# Patient Record
Sex: MALE | Race: WHITE | Employment: FULL TIME | URBAN - METROPOLITAN AREA
[De-identification: names, ages, dates, MRNs, and addresses within clinical notes are randomized per-mention and may not be internally consistent; named-entity substitution may affect disease eponyms.]

---

## 2017-12-22 ENCOUNTER — OFFICE VISIT (OUTPATIENT)
Dept: FAMILY MEDICINE | Facility: CLINIC | Age: 32
End: 2017-12-22
Payer: COMMERCIAL

## 2017-12-22 VITALS
WEIGHT: 148 LBS | HEART RATE: 114 BPM | OXYGEN SATURATION: 95 % | BODY MASS INDEX: 19.61 KG/M2 | TEMPERATURE: 98.6 F | RESPIRATION RATE: 16 BRPM | DIASTOLIC BLOOD PRESSURE: 88 MMHG | HEIGHT: 73 IN | SYSTOLIC BLOOD PRESSURE: 108 MMHG

## 2017-12-22 DIAGNOSIS — A60.01 HERPES SIMPLEX INFECTION OF PENIS: ICD-10-CM

## 2017-12-22 DIAGNOSIS — Z11.3 SCREEN FOR STD (SEXUALLY TRANSMITTED DISEASE): Primary | ICD-10-CM

## 2017-12-22 PROCEDURE — 87591 N.GONORRHOEAE DNA AMP PROB: CPT | Performed by: PHYSICIAN ASSISTANT

## 2017-12-22 PROCEDURE — 86780 TREPONEMA PALLIDUM: CPT | Performed by: PHYSICIAN ASSISTANT

## 2017-12-22 PROCEDURE — 36415 COLL VENOUS BLD VENIPUNCTURE: CPT | Performed by: PHYSICIAN ASSISTANT

## 2017-12-22 PROCEDURE — 87491 CHLMYD TRACH DNA AMP PROBE: CPT | Performed by: PHYSICIAN ASSISTANT

## 2017-12-22 PROCEDURE — 87389 HIV-1 AG W/HIV-1&-2 AB AG IA: CPT | Performed by: PHYSICIAN ASSISTANT

## 2017-12-22 PROCEDURE — 99213 OFFICE O/P EST LOW 20 MIN: CPT | Performed by: PHYSICIAN ASSISTANT

## 2017-12-22 PROCEDURE — 86803 HEPATITIS C AB TEST: CPT | Performed by: PHYSICIAN ASSISTANT

## 2017-12-22 NOTE — MR AVS SNAPSHOT
"              After Visit Summary   12/22/2017    Braeden Escobar    MRN: [id number]           Patient Information     Date Of Birth          1985        Visit Information        Provider Department      12/22/2017 11:50 AM Siegler, Nicole Joy, PA-C [facility]        Today's Diagnoses     Screen for STD (sexually transmitted disease)    -  1    Herpes simplex infection of penis           Follow-ups after your visit        Who to contact     If you have questions or need follow up information about today's clinic visit or your schedule please contact [facility] directly at [phone].  Normal or non-critical lab and imaging results will be communicated to you by Zamzeehart, letter or phone within 4 business days after the clinic has received the results. If you do not hear from us within 7 days, please contact the clinic through Zamzeehart or phone. If you have a critical or abnormal lab result, we will notify you by phone as soon as possible.  Submit refill requests through [employer] or call your pharmacy and they will forward the refill request to us. Please allow 3 business days for your refill to be completed.          Additional Information About Your Visit        MyChart Information     [employer] gives you secure access to your electronic health record. If you see a primary care provider, you can also send messages to your care team and make appointments. If you have questions, please call your primary care clinic.  If you do not have a primary care provider, please call [phone] and they will assist you.        Care EveryWhere ID     This is your Care EveryWhere ID. This could be used by other organizations to access your [facility] medical records  [id number]        Your Vitals Were     Pulse Temperature Respirations Height Pulse Oximetry BMI (Body Mass Index)    114 98.6  F (37  C) (Tympanic) 16 6' 1\" (1.854 m) 95% 19.53 kg/m2       Blood Pressure " from Last 3 Encounters:   12/22/17 108/88   03/30/16 103/62   08/20/15 113/76    Weight from Last 3 Encounters:   12/22/17 148 lb (67.1 kg)   03/30/16 147 lb 6.4 oz (66.9 kg)   08/20/15 142 lb (64.4 kg)              We Performed the Following     Anti Treponema     Chlamydia trachomatis PCR     Hepatitis C Screen Reflex to HCV RNA Quant and Genotype     HIV Antigen Antibody Combo     Neisseria gonorrhoeae PCR        Primary Care Provider Office Phone # Fax #    Domenica Hutchison PA-C [phone] [address]  [place] [address]        Equal Access to Services     RICHARD FERNANDES : Hadii quentin kennedy hadlornao Solore, waaxda luqadaha, qaybta kaalmada adeegyada, emy herzog . So [facility] [phone].    ATENCIÓN: Si habla español, tiene a wynne disposición servicios gratuitos de asistencia lingüística. Llame al [phone].    We comply with applicable federal civil rights laws and [place] laws. We do not discriminate on the basis of race, color, national origin, age, disability, sex, sexual orientation, or gender identity.            Thank you!     Thank you for choosing [facility]  for your care. Our goal is always to provide you with excellent care. Hearing back from our patients is one way we can continue to improve our services. Please take a few minutes to complete the written survey that you may receive in the mail after your visit with us. Thank you!             Your Updated Medication List - Protect others around you: Learn how to safely use, store and throw away your medicines at www.disposemymeds.org.          This list is accurate as of: 12/22/17 12:08 PM.  Always use your most recent med list.                   Brand Name Dispense Instructions for use Diagnosis    busPIRone 5 MG tablet    BUSPAR     Take 5 mg by mouth 3 times daily as needed        finasteride 5 MG tablet    PROSCAR     Take 5 mg by mouth daily        LORazepam 0.5  MG tablet    ATIVAN     Take 0.5-2 tablets by mouth 3 times daily as needed For anxiety        sertraline 50 MG tablet    ZOLOFT    90 tablet    TAKE 1 TABLET(50 MG) BY MOUTH DAILY    Anxiety       traZODone 50 MG tablet    DESYREL     Take 50 mg by mouth daily

## 2017-12-22 NOTE — NURSING NOTE
"Chief Complaint   Patient presents with     STD     Requesting STD Testing.       Initial /88 (BP Location: Left arm, Patient Position: Sitting, Cuff Size: Adult Regular)  Pulse 114  Temp 98.6  F (37  C) (Tympanic)  Resp 16  Ht 6' 1\" (1.854 m)  Wt 148 lb (67.1 kg)  SpO2 95%  BMI 19.53 kg/m2 Estimated body mass index is 19.53 kg/(m^2) as calculated from the following:    Height as of this encounter: 6' 1\" (1.854 m).    Weight as of this encounter: 148 lb (67.1 kg).  Medication Reconciliation: complete     Miriam Balbuena LPN  "

## 2017-12-22 NOTE — PROGRESS NOTES
SUBJECTIVE:   Braeden Escobar is a 32 year old male who presents to clinic today for the following health issues:    STD testing      Duration: 3 months ago    Description (location/character/radiation): None    Intensity:  NA    Accompanying signs and symptoms: Denies symptoms    History (similar episodes/previous evaluation): None    Precipitating or alleviating factors: None    Therapies tried and outcome: None     Patients most recent unprotected sexual encounter was 3 months ago. He has known genital herpes. He has not been exposed to new STD that he is aware of. He is sexually active with female partners. He denies symptoms of penile discharge, painful urination or frequent urination, skin rash or pain, testicular swelling or pain. He does have a small lesion on the penile shaft that has been there for a few days, prompting him to be seen today.     Problem list and histories reviewed & adjusted, as indicated.  Additional history: as documented    Patient Active Problem List   Diagnosis     CARDIOVASCULAR SCREENING; LDL GOAL LESS THAN 160     Lyme disease     Genital herpes     Chickenpox     Anxiety     Low back pain     Past Surgical History:   Procedure Laterality Date     C EXPLORE UNDESC TESTIS,ABDOMINAL         Social History   Substance Use Topics     Smoking status: Never Smoker     Smokeless tobacco: Never Used     Alcohol use 1.2 - 1.8 oz/week     2 - 3 Standard drinks or equivalent per week      Comment: occasional use     Family History   Problem Relation Age of Onset     Anxiety Disorder Mother      Cancer - colorectal Maternal Grandfather 50     HEART DISEASE Paternal Grandfather          Current Outpatient Prescriptions   Medication Sig Dispense Refill     traZODone (DESYREL) 50 MG tablet Take 50 mg by mouth daily  2     finasteride (PROSCAR) 5 MG tablet Take 5 mg by mouth daily  3     busPIRone (BUSPAR) 5 MG tablet Take 5 mg by mouth 3 times daily as needed  0     LORazepam (ATIVAN) 0.5 MG  "tablet Take 0.5-2 tablets by mouth 3 times daily as needed For anxiety  0     sertraline (ZOLOFT) 50 MG tablet TAKE 1 TABLET(50 MG) BY MOUTH DAILY (Patient not taking: Reported on 12/22/2017) 90 tablet 0         Reviewed and updated as needed this visit by clinical staffTobacco  Allergies  Meds  Problems  Med Hx  Surg Hx  Fam Hx  Soc Hx        Reviewed and updated as needed this visit by Provider         ROS:  Constitutional, HEENT, cardiovascular, pulmonary, gi and gu systems are negative, except as otherwise noted.      OBJECTIVE:   /88 (BP Location: Left arm, Patient Position: Sitting, Cuff Size: Adult Regular)  Pulse 114  Temp 98.6  F (37  C) (Tympanic)  Resp 16  Ht 6' 1\" (1.854 m)  Wt 148 lb (67.1 kg)  SpO2 95%  BMI 19.53 kg/m2  Body mass index is 19.53 kg/(m^2).  GENERAL: healthy, alert and no distress   (male): testicles normal without atrophy or masses, no hernias, penis normal without urethral discharge and 2 lesions 2mm diameter, flesh toned and non vesicular, without tenderness to palpation or surrounding redness.     Diagnostic Test Results:  Pending below    ASSESSMENT/PLAN:       ICD-10-CM    1. Screen for STD (sexually transmitted disease) Z11.3 Chlamydia trachomatis PCR     HIV Antigen Antibody Combo     Hepatitis C Screen Reflex to HCV RNA Quant and Genotype     Anti Treponema     Neisseria gonorrhoeae PCR   2. Herpes simplex infection of penis A60.01        Discussed labs being evaluated above. He has known herpes infection so we will not test him for that. His lesions on the penile shaft do not appear as herpes lesions and are not painful or itchy to him. They appear as likely 1) irritation vs 2) start of a genital wart. We discussed these possibilities and he will continue to use condoms and monitor these lesions, return if they do not go away on their own. He is recommended to wear loose fitting clothing and undergarments to reduce the potential for irritation of the skin " and lesion/rash formation.     15 total minutes spent with the patient, > 50% face to face discussing the patients concerns and addressing questions in the above assessment and plan.         Nicole Joy Siegler, PA-C  [facility]

## 2017-12-23 LAB
HCV AB SERPL QL IA: NONREACTIVE
HIV 1+2 AB+HIV1 P24 AG SERPL QL IA: NONREACTIVE
T PALLIDUM IGG+IGM SER QL: NEGATIVE

## 2017-12-24 LAB
C TRACH DNA SPEC QL NAA+PROBE: NEGATIVE
N GONORRHOEA DNA SPEC QL NAA+PROBE: NEGATIVE
SPECIMEN SOURCE: NORMAL
SPECIMEN SOURCE: NORMAL

## 2018-01-12 ENCOUNTER — MYC MEDICAL ADVICE (OUTPATIENT)
Dept: FAMILY MEDICINE | Facility: CLINIC | Age: 33
End: 2018-01-12

## 2018-01-17 ENCOUNTER — OFFICE VISIT (OUTPATIENT)
Dept: FAMILY MEDICINE | Facility: CLINIC | Age: 33
End: 2018-01-17
Payer: COMMERCIAL

## 2018-01-17 VITALS
SYSTOLIC BLOOD PRESSURE: 110 MMHG | TEMPERATURE: 97.7 F | RESPIRATION RATE: 16 BRPM | DIASTOLIC BLOOD PRESSURE: 72 MMHG | OXYGEN SATURATION: 98 % | BODY MASS INDEX: 19.35 KG/M2 | HEIGHT: 73 IN | HEART RATE: 78 BPM | WEIGHT: 146 LBS

## 2018-01-17 DIAGNOSIS — A63.0 GENITAL WARTS: Primary | ICD-10-CM

## 2018-01-17 PROCEDURE — 99213 OFFICE O/P EST LOW 20 MIN: CPT | Performed by: FAMILY MEDICINE

## 2018-01-17 RX ORDER — IMIQUIMOD 12.5 MG/.25G
CREAM TOPICAL
Qty: 12 PACKET | Refills: 3 | Status: SHIPPED | OUTPATIENT
Start: 2018-01-17 | End: 2020-03-25

## 2018-01-17 NOTE — MR AVS SNAPSHOT
After Visit Summary   1/17/2018    Braeden Escobar    MRN: [id number]           Patient Information     Date Of Birth          1985        Visit Information        Provider Department      1/17/2018 8:10 AM Rosalinda Galvez DO [facility]        Today's Diagnoses     Genital warts    -  1      Care Instructions      Genital Warts (Condyloma)     Ask your healthcare provider about the HPV vaccine.     Genital warts (also called condyloma) are caused by a virus that is often transmitted sexually. This virus is known as human papillomavirus  (HPV). The warts are often so tiny that they are hard to see. But even tiny warts can cause big trouble, especially in women. Genital warts can cause cell changes that can lead to genital cancers such as cervical cancer. Warts can even be passed to babies during childbirth.  Note: Latex condoms may help protect against genital warts. But condoms don t cover all the areas that can get infected. That means condoms may not protect you completely.   What are the symptoms of genital warts?  Genital warts can be flat. Or they can be raised and look like tiny cauliflowers. They can grow on the penis, vagina, or cervix. They can also grow in and around the rectum, and even in the throat. You can have the virus for many months before the warts appear. Or you may have the virus but never develop visible warts. Once warts form, they are often too small to be noticed. That s why you need regular exams by your healthcare provider. He or she can find tiny warts and can check your cells for changes that mean the virus is present.  What is the treatment of genital warts?  Genital warts tend to grow with time. The smaller the warts are, the easier they are to remove. So don t delay. Warts are most often removed with chemicals. Sometimes they re frozen off with liquid nitrogen. Warts may also be removed with heat or laser. More than one treatment  may be needed. Never try to treat genital warts yourself.  How are genital warts prevented?  To prevent genital warts, consider getting vaccinated. Your healthcare provider can tell you if the vaccine is right for you. Also know your partner s sexual history. Even if someone doesn t have visible warts, he or she can still transmit the virus. Protect yourself by using latex condoms. And get regular medical exams. In women, regular Pap smears can help detect changes caused by genital warts and catch any signs of cervical cancer early. Also, ask your healthcare provider about the HPV vaccine.  Resources  American Social Health Association STD Hotline   399.225.8285   www.ashastd.org  Centers for Disease Control and Prevention   786.734.3829  www.cdc.gov/std   Date Last Reviewed: 1/1/2017 2000-2017 [employer]. [address] [place] [address]. All rights reserved. This information is not intended as a substitute for professional medical care. Always follow your healthcare professional's instructions.        Treating Genital Warts  Warts sometimes go away on their own, remain unchanged, or increase in size and number. Depending on where the warts are, some treatments may work better than others. However, even though these treatments may remove the wart, the virus that caused the wart usually remains in the skin.  Medicines     Medicines can be applied to break warts apart.   Prescription creams and gels can be applied to warts and surrounding skin. Some prompt your immune system to rally against HPV (human papillomavirus), the virus that causes genital warts. Others are caustic agents that destroy warts. Medicines can be applied at the health care provider's office or at home. Often, more than one dose is needed. These treatments sometimes cause skin rashes. Talk to your healthcare provider about possible side effects.     Wart removal     Warts may be removed using a heated wire loop.   Warts  can be removed in a number of ways. These include freezing, heat (cautery), lasers, and surgery. These procedures are done by your regular healthcare provider or a specialist. Before treatment, you may receive local anesthesia to numb the area. The number of treatments depends on how many warts are being removed. Your healthcare provider can give you more details.     Other treatment options for genital warts  As more is learned about HPV, new treatments are being developed to help the body defend itself. Your healthcare provider can tell you more about treatments that may someday be available. There is also a vaccine that can prevent HPV in young men and women before you even have the virus. Your healthcare provider can tell you more.   Date Last Reviewed: 1/1/2017 2000-2017 [employer]. [address] [place] [address], [place] [address]. All rights reserved. This information is not intended as a substitute for professional medical care. Always follow your healthcare professional's instructions.                Follow-ups after your visit        Who to contact     If you have questions or need follow up information about today's clinic visit or your schedule please contact [facility] directly at [phone].  Normal or non-critical lab and imaging results will be communicated to you by MyChart, letter or phone within 4 business days after the clinic has received the results. If you do not hear from us within 7 days, please contact the clinic through Hitmeister[employer] or phone. If you have a critical or abnormal lab result, we will notify you by phone as soon as possible.  Submit refill requests through [employer] or call your pharmacy and they will forward the refill request to us. Please allow 3 business days for your refill to be completed.          Additional Information About Your Visit        MyChart Information     [employer] gives you secure access to your electronic health record. If  "you see a primary care provider, you can also send messages to your care team and make appointments. If you have questions, please call your primary care clinic.  If you do not have a primary care provider, please call [phone] and they will assist you.        Care EveryWhere ID     This is your Care EveryWhere ID. This could be used by other organizations to access your [facility] medical records  [id number]        Your Vitals Were     Pulse Temperature Respirations Height Pulse Oximetry BMI (Body Mass Index)    78 97.7  F (36.5  C) (Tympanic) 16 6' 1\" (1.854 m) 98% 19.26 kg/m2       Blood Pressure from Last 3 Encounters:   01/17/18 110/72   12/22/17 108/88   03/30/16 103/62    Weight from Last 3 Encounters:   01/17/18 146 lb (66.2 kg)   12/22/17 148 lb (67.1 kg)   03/30/16 147 lb 6.4 oz (66.9 kg)              Today, you had the following     No orders found for display         Today's Medication Changes          These changes are accurate as of: 1/17/18  8:54 AM.  If you have any questions, ask your nurse or doctor.               Start taking these medicines.        Dose/Directions    imiquimod 5 % cream   Commonly known as:  ALDARA   Used for:  Genital warts   Started by:  Rosalinda Galvez, DO        Apply a small sized amount to warts or molluscum three times weekly at bedtime.   Wash off after 8 hours.   May use for up to 16 weeks.   Quantity:  12 packet   Refills:  3            Where to get your medicines      These medications were sent to [facility] [address] [place] [address] AT [address] [address]    Hours:  24-hours Phone:  [phone]     imiquimod 5 % cream                Primary Care Provider Office Phone # Fax #    Domenica Hutchison PA-C [phone] [address] [facility] [address]  [facility] [address]        Equal Access to Services     RICHARD FERNANDES AH: Hadii quentin Sanchez, waaxda reed, qaybta valarie bates, " emy castilloamarilis britt'aan ah. So [facility] [phone].    ATENCIÓN: Si colettela juan, tiene a wynne disposición servicios gratuitos de asistencia lingüística. Marco ni [phone].    We comply with applicable federal civil rights laws and [place] laws. We do not discriminate on the basis of race, color, national origin, age, disability, sex, sexual orientation, or gender identity.            Thank you!     Thank you for choosing [facility]  for your care. Our goal is always to provide you with excellent care. Hearing back from our patients is one way we can continue to improve our services. Please take a few minutes to complete the written survey that you may receive in the mail after your visit with us. Thank you!             Your Updated Medication List - Protect others around you: Learn how to safely use, store and throw away your medicines at www.disposemymeds.org.          This list is accurate as of: 1/17/18  8:54 AM.  Always use your most recent med list.                   Brand Name Dispense Instructions for use Diagnosis    busPIRone 5 MG tablet    BUSPAR     Take 5 mg by mouth 3 times daily as needed        finasteride 5 MG tablet    PROSCAR     Take 5 mg by mouth daily        imiquimod 5 % cream    ALDARA    12 packet    Apply a small sized amount to warts or molluscum three times weekly at bedtime.   Wash off after 8 hours.   May use for up to 16 weeks.    Genital warts       LORazepam 0.5 MG tablet    ATIVAN     Take 0.5-2 tablets by mouth 3 times daily as needed For anxiety        sertraline 50 MG tablet    ZOLOFT    90 tablet    TAKE 1 TABLET(50 MG) BY MOUTH DAILY    Anxiety       traZODone 50 MG tablet    DESYREL     Take 50 mg by mouth daily

## 2018-01-17 NOTE — PATIENT INSTRUCTIONS
Genital Warts (Condyloma)     Ask your healthcare provider about the HPV vaccine.     Genital warts (also called condyloma) are caused by a virus that is often transmitted sexually. This virus is known as human papillomavirus  (HPV). The warts are often so tiny that they are hard to see. But even tiny warts can cause big trouble, especially in women. Genital warts can cause cell changes that can lead to genital cancers such as cervical cancer. Warts can even be passed to babies during childbirth.  Note: Latex condoms may help protect against genital warts. But condoms don t cover all the areas that can get infected. That means condoms may not protect you completely.   What are the symptoms of genital warts?  Genital warts can be flat. Or they can be raised and look like tiny cauliflowers. They can grow on the penis, vagina, or cervix. They can also grow in and around the rectum, and even in the throat. You can have the virus for many months before the warts appear. Or you may have the virus but never develop visible warts. Once warts form, they are often too small to be noticed. That s why you need regular exams by your healthcare provider. He or she can find tiny warts and can check your cells for changes that mean the virus is present.  What is the treatment of genital warts?  Genital warts tend to grow with time. The smaller the warts are, the easier they are to remove. So don t delay. Warts are most often removed with chemicals. Sometimes they re frozen off with liquid nitrogen. Warts may also be removed with heat or laser. More than one treatment may be needed. Never try to treat genital warts yourself.  How are genital warts prevented?  To prevent genital warts, consider getting vaccinated. Your healthcare provider can tell you if the vaccine is right for you. Also know your partner s sexual history. Even if someone doesn t have visible warts, he or she can still transmit the virus. Protect yourself by using  latex condoms. And get regular medical exams. In women, regular Pap smears can help detect changes caused by genital warts and catch any signs of cervical cancer early. Also, ask your healthcare provider about the HPV vaccine.  Resources  American Social Health Association STD Hotline   234.302.7515   www.ashastd.org  Centers for Disease Control and Prevention   634.204.5035  www.cdc.gov/std   Date Last Reviewed: 1/1/2017 2000-2017 [employer]. [address] [place] [address]. All rights reserved. This information is not intended as a substitute for professional medical care. Always follow your healthcare professional's instructions.        Treating Genital Warts  Warts sometimes go away on their own, remain unchanged, or increase in size and number. Depending on where the warts are, some treatments may work better than others. However, even though these treatments may remove the wart, the virus that caused the wart usually remains in the skin.  Medicines     Medicines can be applied to break warts apart.   Prescription creams and gels can be applied to warts and surrounding skin. Some prompt your immune system to rally against HPV (human papillomavirus), the virus that causes genital warts. Others are caustic agents that destroy warts. Medicines can be applied at the health care provider's office or at home. Often, more than one dose is needed. These treatments sometimes cause skin rashes. Talk to your healthcare provider about possible side effects.     Wart removal     Warts may be removed using a heated wire loop.   Warts can be removed in a number of ways. These include freezing, heat (cautery), lasers, and surgery. These procedures are done by your regular healthcare provider or a specialist. Before treatment, you may receive local anesthesia to numb the area. The number of treatments depends on how many warts are being removed. Your healthcare provider can give you more  details.     Other treatment options for genital warts  As more is learned about HPV, new treatments are being developed to help the body defend itself. Your healthcare provider can tell you more about treatments that may someday be available. There is also a vaccine that can prevent HPV in young men and women before you even have the virus. Your healthcare provider can tell you more.   Date Last Reviewed: 1/1/2017 2000-2017 [employer]. [address] [place] [address], [place] [address]. All rights reserved. This information is not intended as a substitute for professional medical care. Always follow your healthcare professional's instructions.

## 2018-01-17 NOTE — PROGRESS NOTES
"  SUBJECTIVE:   Braeden Escobar is a 32 year old male who presents to clinic today for the following health issues:        Recheck lesion(bump) on genitals      Duration: Ongoing for the last 5 weeks    Description (location/character/radiation): No change in lesion    Intensity:  mild    Accompanying signs and symptoms: See above    History (similar episodes/previous evaluation): None    Precipitating or alleviating factors: None    Therapies tried and outcome: None     No prior history genital warts.  Not itchy/painful.  No redness or discharge.  No change in size.  There are 3 total bumps;  2 in a cluster and one by itself.  No new sexual contact since last OV.  See previous OV note (12/22/17) for details.    Problem list and histories reviewed & adjusted, as indicated.  Additional history: as documented    Labs reviewed in EPIC    Reviewed and updated as needed this visit by clinical staffTobacco  Allergies  Meds  Med Hx  Surg Hx  Fam Hx  Soc Hx      Reviewed and updated as needed this visit by Provider         ROS:  C: NEGATIVE for fever, chills, change in weight  INTEGUMENTARY/SKIN: POSITIVE for lesions on penis  E/M: NEGATIVE for ear, mouth and throat problems  R: NEGATIVE for significant cough or SOB  CV: NEGATIVE for chest pain, palpitations or peripheral edema  GI: NEGATIVE for nausea, abdominal pain, heartburn, or change in bowel habits  : NEGATIVE for frequency, dysuria, or hematuria  M: NEGATIVE for significant arthralgias or myalgia  H: NEGATIVE for bleeding problems    OBJECTIVE:     /72 (BP Location: Left arm, Patient Position: Sitting, Cuff Size: Adult Regular)  Pulse 78  Temp 97.7  F (36.5  C) (Tympanic)  Resp 16  Ht 6' 1\" (1.854 m)  Wt 146 lb (66.2 kg)  SpO2 98%  BMI 19.26 kg/m2  Body mass index is 19.26 kg/(m^2).   GENERAL: alert and no distress  EYES: Eyes grossly normal to inspection, PERRL and conjunctivae and sclerae normal  HENT: nose and mouth without ulcers or " lesions  NECK: no adenopathy, no asymmetry, masses, or scars and thyroid normal to palpation  RESP: lungs clear to auscultation - no rales, rhonchi or wheezes  CV: regular rate and rhythm, normal S1 S2, no S3 or S4, no murmur, click or rub, no peripheral edema and peripheral pulses strong   (male): testicles normal without atrophy or masses, no hernias, penis without urethral discharge; 3 lesions total (2 found in a cluster and one by itself, measuring 1-2mm diameter each), flesh toned, pedunculated, and non vesicular, without tenderness to palpation or surrounding erythema.    Diagnostic Test Results:  none     ASSESSMENT/PLAN:     Problem List Items Addressed This Visit     None      Visit Diagnoses     Genital warts    -  Primary    Relevant Medications    imiquimod (ALDARA) 5 % cream           Discussed various treatments for genitals warts.  Pt would like to start cream.  Information on genital warts, along with treatment info discussed during the visit and handout provided to pt as well.  Pt expressed understanding of treatment plan and risks of side effects involved in cream use, including hypopigmentation and local inflammation/irritation.  Will call clinic with any questions and return in 16-18 weeks or sooner if needed for f/u.    Rosalinda Galvez DO  [facility]

## 2018-01-17 NOTE — NURSING NOTE
"Chief Complaint   Patient presents with     Lesion     Recheck lesion, no change in bump       Initial /72 (BP Location: Left arm, Patient Position: Sitting, Cuff Size: Adult Regular)  Pulse 78  Temp 97.7  F (36.5  C) (Tympanic)  Resp 16  Ht 6' 1\" (1.854 m)  Wt 146 lb (66.2 kg)  SpO2 98%  BMI 19.26 kg/m2 Estimated body mass index is 19.26 kg/(m^2) as calculated from the following:    Height as of this encounter: 6' 1\" (1.854 m).    Weight as of this encounter: 146 lb (66.2 kg).  Medication Reconciliation: complete     Miriam Balbuena LPN  "

## 2018-03-01 ENCOUNTER — OFFICE VISIT (OUTPATIENT)
Dept: FAMILY MEDICINE | Facility: CLINIC | Age: 33
End: 2018-03-01
Payer: COMMERCIAL

## 2018-03-01 VITALS
OXYGEN SATURATION: 96 % | HEART RATE: 85 BPM | HEIGHT: 73 IN | BODY MASS INDEX: 18.95 KG/M2 | WEIGHT: 143 LBS | DIASTOLIC BLOOD PRESSURE: 64 MMHG | SYSTOLIC BLOOD PRESSURE: 117 MMHG | TEMPERATURE: 97.7 F

## 2018-03-01 DIAGNOSIS — R53.83 FATIGUE, UNSPECIFIED TYPE: Primary | ICD-10-CM

## 2018-03-01 PROCEDURE — 99213 OFFICE O/P EST LOW 20 MIN: CPT | Performed by: NURSE PRACTITIONER

## 2018-03-01 NOTE — PROGRESS NOTES
HPI      SUBJECTIVE:   Braeden Escobar is a 32 year old male who presents to clinic today for the following health issues:    Chief Complaint   Patient presents with     Patient Inquiry     unable to gain weight, cognitive not as sharp, fatigue.  Pt wonders about a medicine to help gain weight       Wants his thyroid checked  He feels he is too thin, fatigued and feels his mental cognition has diminished. Doesn't feel as sharp  He has been the same weight since high school   Has tried to gain weight with added nutrition and weight lifting   Last year has had difficulty sleeping  Fatigue is persistent, but it does wax and wane       Past Medical History:   Diagnosis Date     Chickenpox     As a child around age 5     Genital herpes      Lyme disease      Past Surgical History:   Procedure Laterality Date     C EXPLORE UNDESC TESTIS,ABDOMINAL       Social History   Substance Use Topics     Smoking status: Never Smoker     Smokeless tobacco: Never Used     Alcohol use 1.2 - 1.8 oz/week     2 - 3 Standard drinks or equivalent per week      Comment: occasional use     Current Outpatient Prescriptions   Medication Sig Dispense Refill     imiquimod (ALDARA) 5 % cream Apply a small sized amount to warts or molluscum three times weekly at bedtime.   Wash off after 8 hours.   May use for up to 16 weeks. 12 packet 3     traZODone (DESYREL) 50 MG tablet Take 50 mg by mouth daily  2     busPIRone (BUSPAR) 5 MG tablet Take 5 mg by mouth 3 times daily as needed  0     finasteride (PROSCAR) 5 MG tablet Take 5 mg by mouth daily  3     LORazepam (ATIVAN) 0.5 MG tablet Take 0.5-2 tablets by mouth 3 times daily as needed For anxiety  0     sertraline (ZOLOFT) 50 MG tablet TAKE 1 TABLET(50 MG) BY MOUTH DAILY 90 tablet 0     Allergies   Allergen Reactions     Celexa [Citalopram]      shakiness     Zoloft [Sertraline]      Loss of libido       Reviewed and updated as needed this visit by clinical staff and provider      ROS  Detailed as  "above     /64 (Cuff Size: Adult Regular)  Pulse 85  Temp 97.7  F (36.5  C) (Oral)  Ht 6' 1\" (1.854 m)  Wt 143 lb (64.9 kg)  SpO2 96%  BMI 18.87 kg/m2      Physical Exam   Constitutional: He is well-developed, well-nourished, and in no distress.   Thin stature    HENT:   Head: Normocephalic.   Eyes: Conjunctivae are normal.   Pulmonary/Chest: Effort normal.   Neurological: He is alert.   Psychiatric: Mood and affect normal.   Vitals reviewed.      Assessment and Plan:       ICD-10-CM    1. Fatigue, unspecified type R53.83        Patient presents today as he is concerned about being too thin.  He has been the same weight since high school but he is interested in building muscle mass just to 'look better.' He is doing everything right in terms of nutrition and weight lifting.   We discussed supplements, such as probiotics and Vit D   We will f/u with PCP if new concerns come up       The total visit time was 15 minutes more  than 50% was spent in counseling and coordination of care as discussed above.      Alan Hodgson, APRN, CNP  [facility]    "

## 2018-03-01 NOTE — PATIENT INSTRUCTIONS
Think about getting a probiotic. I suggest the multiple organism kind that you keep in the fridge.  I recommend lactobacillus acidophilus (helps with reducing stomach acid) with bifido (helps with bowels)    Vitamin D 2000iu daily

## 2018-03-01 NOTE — NURSING NOTE
"Chief Complaint   Patient presents with     Patient Inquiry     unable to gain weight, cognitive not as sharp, fatigue.  Pt wonders about a medicine to help gain weight       Initial /64 (Cuff Size: Adult Regular)  Pulse 85  Temp 97.7  F (36.5  C) (Oral)  Ht 6' 1\" (1.854 m)  Wt 143 lb (64.9 kg)  SpO2 96%  BMI 18.87 kg/m2 Estimated body mass index is 18.87 kg/(m^2) as calculated from the following:    Height as of this encounter: 6' 1\" (1.854 m).    Weight as of this encounter: 143 lb (64.9 kg).  Medication Reconciliation: complete    "

## 2018-08-13 ENCOUNTER — TELEPHONE (OUTPATIENT)
Dept: FAMILY MEDICINE | Facility: CLINIC | Age: 33
End: 2018-08-13

## 2020-03-25 ENCOUNTER — VIRTUAL VISIT (OUTPATIENT)
Dept: FAMILY MEDICINE | Facility: CLINIC | Age: 35
End: 2020-03-25
Payer: COMMERCIAL

## 2020-03-25 DIAGNOSIS — R51.9 SINUS HEADACHE: Primary | ICD-10-CM

## 2020-03-25 PROCEDURE — 99213 OFFICE O/P EST LOW 20 MIN: CPT | Mod: TEL | Performed by: PHYSICIAN ASSISTANT

## 2020-03-25 RX ORDER — FINASTERIDE 5 MG/1
TABLET, FILM COATED ORAL
Qty: 45 TABLET | Refills: 3 | Status: SHIPPED | OUTPATIENT
Start: 2020-03-25 | End: 2021-06-28

## 2020-03-25 RX ORDER — FLUTICASONE PROPIONATE 50 MCG
1 SPRAY, SUSPENSION (ML) NASAL DAILY
Qty: 16 G | Refills: 1 | Status: SHIPPED | OUTPATIENT
Start: 2020-03-25 | End: 2022-06-09

## 2020-03-25 NOTE — PROGRESS NOTES
"Braeden Escobar is a 34 year old male who is being evaluated via a telephone visit.      The patient has been notified of following (by VINEET Blackwell CMA     \"We have found that certain health care needs can be provided without the need for a physical exam.  This service lets us provide the care you need with a short phone conversation.  If a prescription is necessary we can send it directly to your pharmacy.  If lab work is needed we can place an order for that and you can then stop by our lab to have the test done at a later time.    This telephone visit will be conducted via 3 way call with the you (the patient) , the physician/provider, and a me all on the line at the same time.  This allows your physician/provider to have the phone conversation with you while I will be taking notes for your medical record.  We will have full access to your [facility] medical record during this entire phone call.    Since this is like an office visit,  will bill your insurance company for this service.  Please check with your medical insurance if this type of telephone/virtual is covered . You may be responsible for the cost of this service if insurance coverage is denied.  The typical cost is $30 (10min), $59(11-20min) and $85 (21-30min)     If during the course of the call the physician/provider feels a telephone visit is not appropriate, you will not be charged for this service\"    Consent has been obtained for this service by care team member: yes.  See the scanned image in the medical record.    S: The history as provided by the patient to the provider during this 3 way call include not feeling well  Pt has not been feeling well for 2 months  Initially had fatigue off and on  Was prescribed antibiotics (Augmentin 875mg BID x 10d) on 2/27/20 from an on call clinic for possible sinusitis.  This seemed to help temp but now sxs recurring.  Currently waking up with daily headaches  Takes some ibuprofen with some " relief  Headaches are intermittent rate 5-6/10  The pressure in around eyes and forehead and temples  Denies any current teeth pain but did have that initially.  Wearing glasses makes pain worse.  He is not having nasal discharge besides some morning clear discharge when he blows his nose.  Denies cough, sneezing or itchy/ watery eyes.  Denies vision changes.  Denies extremity numbness or weakness  Typically doesn't have HA later in the day or at night.  Denies personal or FH of migraines.  He typically doesn't get HA  Sometimes has spring allergies.    Past Medical History:   Diagnosis Date     Chickenpox     As a child around age 5     Genital herpes      Lyme disease      Past Surgical History:   Procedure Laterality Date     C EXPLORE UNDESC TESTIS,ABDOMINAL       Social History     Tobacco Use     Smoking status: Never Smoker     Smokeless tobacco: Never Used   Substance Use Topics     Alcohol use: Yes     Alcohol/week: 2.0 - 3.0 standard drinks     Types: 2 - 3 Standard drinks or equivalent per week     Comment: occasional use     Current Outpatient Medications   Medication Sig Dispense Refill     finasteride (PROSCAR) 5 MG tablet Take 1/4 tab by mouth daily 45 tablet 3     fluticasone (FLONASE) 50 MCG/ACT nasal spray Spray 1 spray into both nostrils daily 16 g 1     Allergies   Allergen Reactions     Celexa [Citalopram]      shakiness     Zoloft [Sertraline]      Loss of libido     FAMILY HISTORY NOTED AND REVIEWED      Assessment and Plan:     (R51) Sinus headache  (primary encounter diagnosis)  Comment: recd he start Zyrtec 10mg every day to see if this allergy related.  Plan: finasteride (PROSCAR) 5 MG tablet, fluticasone         (FLONASE) 50 MCG/ACT nasal spray        1-2 sprays each nostril daily.  Call back in 2-3 weeks if sxs worsen or persist.      Domenica Hutchison PA-C        Total time of call between patient and provider was 17 minutes   ===================================================    I have  reviewed the note as documented above.  This accurately captures the substance of my conversation with the patient,

## 2020-04-24 ENCOUNTER — TRANSFERRED RECORDS (OUTPATIENT)
Dept: HEALTH INFORMATION MANAGEMENT | Facility: CLINIC | Age: 35
End: 2020-04-24

## 2020-10-20 ENCOUNTER — APPOINTMENT (OUTPATIENT)
Dept: MRI IMAGING | Facility: CLINIC | Age: 35
End: 2020-10-20
Attending: EMERGENCY MEDICINE
Payer: COMMERCIAL

## 2020-10-20 ENCOUNTER — HOSPITAL ENCOUNTER (EMERGENCY)
Facility: CLINIC | Age: 35
Discharge: HOME | End: 2020-10-20
Attending: EMERGENCY MEDICINE | Admitting: EMERGENCY MEDICINE
Payer: COMMERCIAL

## 2020-10-20 VITALS
SYSTOLIC BLOOD PRESSURE: 141 MMHG | HEIGHT: 73 IN | DIASTOLIC BLOOD PRESSURE: 79 MMHG | HEART RATE: 92 BPM | TEMPERATURE: 98.2 F | WEIGHT: 150 LBS | RESPIRATION RATE: 16 BRPM | BODY MASS INDEX: 19.88 KG/M2 | OXYGEN SATURATION: 99 %

## 2020-10-20 DIAGNOSIS — R51.9 ACUTE NONINTRACTABLE HEADACHE, UNSPECIFIED HEADACHE TYPE: ICD-10-CM

## 2020-10-20 PROCEDURE — 99284 EMERGENCY DEPT VISIT MOD MDM: CPT | Mod: 25

## 2020-10-20 PROCEDURE — 70551 MRI BRAIN STEM W/O DYE: CPT

## 2020-10-20 ASSESSMENT — ENCOUNTER SYMPTOMS
CHILLS: 0
COUGH: 0
FEVER: 0
HEADACHES: 1
FACIAL SWELLING: 1
NUMBNESS: 1

## 2020-10-20 ASSESSMENT — MIFFLIN-ST. JEOR: SCORE: 1669.28

## 2020-10-21 NOTE — ED TRIAGE NOTES
Patient has been seen for headaches, sinus pressure, and other neurological symptoms recently and was told by his PCP that he needs to be seen in the ED. He has had a CT scan back in April.

## 2020-10-21 NOTE — DISCHARGE INSTRUCTIONS
Follow up with your doctor and neurologist as scheduled. Return with any new or concerning symptoms.    Discharge Instructions  Headache    You were seen today for a headache. Headaches may be caused by many different things such as muscle tension, sinus inflammation, anxiety and stress, having too little sleep, too much alcohol, some medical conditions or injury. You may have a migraine, which is caused by changes in the blood vessels in your head.  At this time your provider does not find that your headache is a sign of anything dangerous or life-threatening.  However, sometimes the signs of serious illness do not show up right away.      Generally, every Emergency Department visit should have a follow-up clinic visit with either a primary or a specialty clinic/provider. Please follow-up as instructed by your emergency provider today.    Return to the Emergency Department if:  You get a new fever of 100.4 F or higher.  Your headache gets much worse.  You get a stiff neck with your headache.  You get a new headache that is significantly different or worse than headaches you have had before.  You are vomiting (throwing up) and cannot keep food or water down.  You have blurry or double vision or other problems with your eyes.  You have a new weakness on one side of your body.  You have difficulty with balance which is new.  You or your family thinks you are confused.  You have a seizure.    What can I do to help myself?  Pain medications - You may take a pain medication such as Tylenol  (acetaminophen), Advil , Motrin  (ibuprofen) or Aleve  (naproxen).  Take a pain reliever as soon as you notice symptoms.  Starting medications as soon as you start to have symptoms may lessen the amount of pain you have.  Relaxing in a quiet, dark room may help.  Get enough sleep and eat meals regularly.  You may need to watch for certain foods or other things which may trigger your headaches.  Keeping a journal of your headaches and  possible triggers may help you and your primary provider to identify things which you should avoid which may be causing your headaches.  If you were given a prescription for medicine here today, be sure to read all of the information (including the package insert) that comes with your prescription.  This will include important information about the medicine, its side effects, and any warnings that you need to know about.  The pharmacist who fills the prescription can provide more information and answer questions you may have about the medicine.  If you have questions or concerns that the pharmacist cannot address, please call or return to the Emergency Department.   Remember that you can always come back to the Emergency Department if you are not able to see your regular provider in the amount of time listed above, if you get any new symptoms, or if there is anything that worries you.

## 2020-10-31 ENCOUNTER — TRANSFERRED RECORDS (OUTPATIENT)
Dept: HEALTH INFORMATION MANAGEMENT | Facility: CLINIC | Age: 35
End: 2020-10-31

## 2020-10-31 LAB
ALT SERPL-CCNC: 13 U/L
AST SERPL-CCNC: 20 U/L (ref 17–59)
CREATININE (EXTERNAL): 1 MG/DL (ref 0.7–1.4)
GFR ESTIMATED (EXTERNAL): >60 ML/MIN/1.73M2
GLUCOSE (EXTERNAL): 102 MG/DL (ref 60–99)
POTASSIUM (EXTERNAL): 4.4 MMOL/L (ref 3.5–5.1)
TSH SERPL-ACNC: 1.1 MIU/L (ref 0.4–4.5)

## 2020-11-05 ENCOUNTER — TRANSFERRED RECORDS (OUTPATIENT)
Dept: HEALTH INFORMATION MANAGEMENT | Facility: CLINIC | Age: 35
End: 2020-11-05

## 2020-11-07 ENCOUNTER — HOSPITAL ENCOUNTER (EMERGENCY)
Facility: CLINIC | Age: 35
Discharge: HOME | End: 2020-11-07
Attending: EMERGENCY MEDICINE | Admitting: EMERGENCY MEDICINE
Payer: COMMERCIAL

## 2020-11-07 VITALS
SYSTOLIC BLOOD PRESSURE: 134 MMHG | DIASTOLIC BLOOD PRESSURE: 83 MMHG | OXYGEN SATURATION: 99 % | RESPIRATION RATE: 18 BRPM | TEMPERATURE: 98.2 F | BODY MASS INDEX: 19.88 KG/M2 | WEIGHT: 150 LBS | HEIGHT: 73 IN | HEART RATE: 67 BPM

## 2020-11-07 DIAGNOSIS — R20.2 PARESTHESIA OF BOTH HANDS: ICD-10-CM

## 2020-11-07 DIAGNOSIS — G44.219 EPISODIC TENSION-TYPE HEADACHE, NOT INTRACTABLE: ICD-10-CM

## 2020-11-07 PROCEDURE — 99282 EMERGENCY DEPT VISIT SF MDM: CPT

## 2020-11-07 RX ORDER — BUTALBITAL, ACETAMINOPHEN AND CAFFEINE 50; 325; 40 MG/1; MG/1; MG/1
1 TABLET ORAL EVERY 4 HOURS PRN
Qty: 15 TABLET | Refills: 0 | Status: SHIPPED | OUTPATIENT
Start: 2020-11-07 | End: 2022-06-09

## 2020-11-07 RX ORDER — LORAZEPAM 0.5 MG/1
0.5 TABLET ORAL EVERY 6 HOURS PRN
Qty: 10 TABLET | Refills: 0 | Status: SHIPPED | OUTPATIENT
Start: 2020-11-07 | End: 2022-06-09

## 2020-11-07 RX ORDER — KETOROLAC TROMETHAMINE 15 MG/ML
15 INJECTION, SOLUTION INTRAMUSCULAR; INTRAVENOUS ONCE
Status: DISCONTINUED | OUTPATIENT
Start: 2020-11-07 | End: 2020-11-07

## 2020-11-07 RX ORDER — HALOPERIDOL 5 MG/ML
2 INJECTION INTRAMUSCULAR ONCE
Status: DISCONTINUED | OUTPATIENT
Start: 2020-11-07 | End: 2020-11-07

## 2020-11-07 ASSESSMENT — ENCOUNTER SYMPTOMS
PHOTOPHOBIA: 0
NUMBNESS: 1
NAUSEA: 1
HEADACHES: 1
FEVER: 0

## 2020-11-07 ASSESSMENT — MIFFLIN-ST. JEOR: SCORE: 1669.28

## 2020-11-07 NOTE — DISCHARGE INSTRUCTIONS
You have been prescribed medication for migraine headache through your regular doctor.  Trial Fioricet.  Use Ativan as needed for anxiety.  Please follow-up with your regular doctor to discuss long-term treatments for chronic headaches due to lack of MRI findings and discuss with your neurologist due to numbness in both hands and evaluation for peripheral neuropathy.

## 2020-11-07 NOTE — ED TRIAGE NOTES
Headaches for 7 months with paresthesias in both arms. He has had MRIs of his brain a few weeks ago and cervical spine on Thursday which were normal.

## 2020-11-07 NOTE — ED AVS SNAPSHOT
[facility]  [address] [address]  Phone: [phone]  Fax: [phone]                                    Braeden Escobar   MRN: [id number]    Department: [facility]   Date of Visit: 11/7/2020           After Visit Summary Signature Page    I have received my discharge instructions, and my questions have been answered. I have discussed any challenges I see with this plan with the nurse or doctor.    ..........................................................................................................................................  Patient/Patient Representative Signature      ..........................................................................................................................................  Patient Representative Print Name and Relationship to Patient    ..................................................               ................................................  Date                                   Time    ..........................................................................................................................................  Reviewed by Signature/Title    ...................................................              ..............................................  Date                                               Time          22EPIC Rev 08/18

## 2020-11-07 NOTE — ED PROVIDER NOTES
"History     Chief Complaint:  Headache      HPI  Braeden Escobar is a 35 year old male who presents for evaluation of headaches. The patient reports that he has been dealing with intermittent, but frequent, headaches over the last 7 months with associated paresthesias and intermittent nausea. Patient was seen here on 10/20 and had a normal Brain MRI at that time with reading as below. The patient states he has seen a neurologist recently as well who did a cervical spine MRI for him that also resulted as normal. He states the neurologist he saw noted this could be related to a tension headache problem. The patient comes in today because over the last 4 days he has had increased sensations of numbness and tingling along with a inability to focus as well which he describes as \"I feel as thought I can't fully tune in mentally.\". The patient denies any fever, light or sound sensitivity, and any other known symptoms or concerns at this time.     Brain MRI without contrast(10/20/20):  1. Normal brain MRI.  Report per radiology.    Allergies:  Celexa [Citalopram]  Zoloft [Sertraline]    Medications:    Proscar    Past Medical History:    Genital herpes   Lyme disease   Anxiety  Cardiovascular Screening; LDL goal less than 160    Past Surgical History:    C Explore Undesc Testis, Abdominal    Family History:    Anxiety Disorder    Social History:  The patient presents to the ED alone.   Tobacco Use: Never  Alcohol Use: Yes  Drug Use: No    Review of Systems   Constitutional: Negative for fever.   Eyes: Negative for photophobia.   Gastrointestinal: Positive for nausea.   Neurological: Positive for numbness (w/ Tingling) and headaches.   All other systems reviewed and are negative.    \    Physical Exam     Patient Vitals for the past 24 hrs:   BP Temp Pulse Resp SpO2 Height Weight   11/07/20 1525 -- -- 67 -- 99 % -- --   11/07/20 1520 134/83 -- -- -- -- -- --   11/07/20 1231 119/77 98.2  F (36.8  C) 91 18 96 % 1.854 m (6' 1\") " 68 kg (150 lb)       Physical Exam  Vitals signs reviewed.   HENT:      Head: Normocephalic.      Mouth/Throat:      Mouth: Mucous membranes are moist.   Cardiovascular:      Rate and Rhythm: Normal rate and regular rhythm.   Pulmonary:      Effort: Pulmonary effort is normal.   Abdominal:      General: Abdomen is flat.   Skin:     Capillary Refill: Capillary refill takes less than 2 seconds.   Neurological:      General: No focal deficit present.      Mental Status: He is alert and oriented to person, place, and time.   Psychiatric:         Mood and Affect: Mood normal.           Emergency Department Course     Emergency Department Course:  Past medical records, nursing notes, and vitals reviewed.    1450 I performed an exam of the patient as documented above.     1505 I rechecked the patient and discussed the results of his workup thus far.      IV was inserted and blood was drawn for laboratory testing, results above.    1527 Patient rechecked and updated. Plan of care discussed and questions answered.     Findings and plan explained to the Patient. Patient discharged home with instructions regarding supportive care, medications, and reasons to return. The importance of close follow-up was reviewed. The patient was prescribed Ativan and Esgic.     I personally reviewed the laboratory results with the Patient and answered all related questions prior to discharge.     Impression & Plan     Medical Decision Making:  Patient presents with ongoing headache x7 months.  Patient gives a vague history of relatively constant headaches.  Has already been seen by neurology has already had an MRI of the brain and the C-spine which are normal.  He has had numbness in both hands suspect some type of hyperventilation or anxiety equivalent patient was offered medication and treatment for headache in the emergency room but then refused.  Unclear patient is interested in his ER visit today for now will recommend discharge and trial  Fioricet and Ativan as a bridge to follow-up with neurology.  No clinical concern for subarachnoid hemorrhage did consider meningitis and encephalitis but due to 7 months of symptoms do not feel an LP is appropriate at this time.  Patient is not confused and is awake and alert with a GCS of 15.  We will offer medications for headache as a bridge to follow-up with neurology.    Diagnosis:    ICD-10-CM    1. Episodic tension-type headache, not intractable  G44.219    2. Paresthesia of both hands  R20.2        Disposition:  Discharged to home.    Discharge Medications:  New Prescriptions    BUTALBITAL-ACETAMINOPHEN-CAFFEINE (ESGIC) -40 MG TABLET    Take 1 tablet by mouth every 4 hours as needed for headaches    LORAZEPAM (ATIVAN) 0.5 MG TABLET    Take 1 tablet (0.5 mg) by mouth every 6 hours as needed for anxiety       Scribe Disclosure:  I, Jack Beatrice, am serving as a scribe at 2:48 PM on 11/7/2020 to document services personally performed by Rahul Blanca MD based on my observations and the provider's statements to me.      Rahul Blanca MD  11/08/20 2054

## 2020-12-08 ENCOUNTER — TELEPHONE (OUTPATIENT)
Dept: FAMILY MEDICINE | Facility: CLINIC | Age: 35
End: 2020-12-08

## 2020-12-08 NOTE — TELEPHONE ENCOUNTER
PCP,    Pt saw you in March for headaches. During the visit pt mentioned the severe headaches between eyes and in nasal passage and forehead.  He was rx d allergy medication.  He did end up having a CT scan in April/May.  It was clear for ailment.  Pt is calling in because the symptoms are persisting again now including fatigue.    He was treated for Lyme's disease but isn't feeling any better. He would like a second opinion and wondering if he could be referred to a specialist.     Would you like another visit to discuss or can you order a referral to a specialist?    Thank you,  Noam ESTRADA RN

## 2020-12-08 NOTE — TELEPHONE ENCOUNTER
Reason for call:  Patient reporting a symptom    Symptom or request: headaches since march, lethargy    Duration (how long have symptoms been present):     Have you been treated for this before? Yes  Had 3wk doxycycline    Additional comments: pt was treated for lyme's disease. Pt is not better after treatment and would like a second opinion. Wondering if a specialist is appropriate. Wants to be treated before the new year for insurance    Phone Number patient can be reached at:  Home number on file [phone] (home)    Best Time:  any    Can we leave a detailed message on this number:  YES    Call taken on 12/8/2020 at 5:15 PM by Braeden Trujillo

## 2020-12-09 NOTE — TELEPHONE ENCOUNTER
I am covering for Domenica Katz to schedule appointment with team as Domenica is out of the office   Dr.Nasima Igor MD

## 2020-12-09 NOTE — TELEPHONE ENCOUNTER
Returned call to patient.     Symptoms:  Nearly constant severe HAs, fatigue, body aches.  Dx'd Lymes in 2020.   Reports recently completed 3 wks of Doxy.   Patient reports has been seen by a variety of specialists, however no improvement of symptoms.   Hoping to assessed at clinic visit and referral places to appropriate specialist still in 2020 due to insurance.     Fever: no    Plan: clinic appointment with provider  Caller agrees with this plan/advise.    Advise per RN protocols.   See protocol, assessment, disposition, and care advise.      [phone] (home)     Jessica QUINTANA RN,BSN

## 2020-12-09 NOTE — TELEPHONE ENCOUNTER
"Patient reports symptoms x 10 mths---ongoing.   Patient has clinic appointment scheduled tomorrow at 1:30pm.    Are you wanting this appointment cancelled?     Patient hoping for referral to \"specialist\" to help with these ongoing problems.     No fever  Denies cough or resp problems.   No new symptoms.       Jessica QUINTANA RN,BSN      "

## 2020-12-10 ENCOUNTER — OFFICE VISIT (OUTPATIENT)
Dept: FAMILY MEDICINE | Facility: CLINIC | Age: 35
End: 2020-12-10
Payer: COMMERCIAL

## 2020-12-10 VITALS
BODY MASS INDEX: 19.39 KG/M2 | WEIGHT: 147 LBS | DIASTOLIC BLOOD PRESSURE: 90 MMHG | TEMPERATURE: 98.6 F | HEART RATE: 93 BPM | OXYGEN SATURATION: 98 % | SYSTOLIC BLOOD PRESSURE: 144 MMHG

## 2020-12-10 DIAGNOSIS — A69.20 LYME DISEASE: Primary | ICD-10-CM

## 2020-12-10 PROCEDURE — 99213 OFFICE O/P EST LOW 20 MIN: CPT | Performed by: NURSE PRACTITIONER

## 2020-12-10 NOTE — TELEPHONE ENCOUNTER
RECORDS RECEIVED FROM:    DATE RECEIVED: 12.22.2020   NOTES (Gather within 2 years) STATUS DETAILS   OFFICE NOTE from referring provider   N/A    OFFICE NOTE from other specialist Internal 12.10.2020 Alan Hodgson APRN CNP   DISCHARGE SUMMARY from hospital N/A    DISCHARGE REPORT from the ER N/A    LABS (any labs) Internal    MEDICATION LIST Internal    IMAGING  (NEED IMAGES AND REPORTS)     Osteomyelitis: Foot imaging  N/A    Liver Abscess: Abdominal imaging N/A    Other (anything related to diagnoses N/A

## 2020-12-10 NOTE — PROGRESS NOTES
Subjective     Braeden Escobar is a 35 year old male who presents to clinic today for the following health issues:    HPI         Patient Dx with Lymes in early November, finished Abx, still having headaches; would like to know if there is any thing further he should do or how to feel better    Started getting HA in March. Multiple visits and abx   Face and hands went numb   After seeing neuro and normal scans, things went downhill   Felt really sick constantly   Was then dx with lymes almost 2 months ago  Took doxy for 3 weeks. Stopped 2 weeks ago   Feels 50% improved from before tx   Still has a HUGGINS everyday that gets worse throughout the day   Feels full body fatigue and illness. Also has brain fog   Better than he was 2 months ago but not close to normal yet   No chest symptoms: CP, SOB     Past Medical History:   Diagnosis Date     Chickenpox     As a child around age 5     Genital herpes      Lyme disease      Family History   Problem Relation Age of Onset     Anxiety Disorder Mother      Cancer - colorectal Maternal Grandfather 50     Heart Disease Paternal Grandfather      Past Surgical History:   Procedure Laterality Date     C EXPLORE UNDESC TESTIS,ABDOMINAL       Social History     Tobacco Use     Smoking status: Never Smoker     Smokeless tobacco: Never Used   Substance Use Topics     Alcohol use: Yes     Alcohol/week: 2.0 - 3.0 standard drinks     Types: 2 - 3 Standard drinks or equivalent per week     Comment: occasional use     Current Outpatient Medications   Medication Sig Dispense Refill     butalbital-acetaminophen-caffeine (ESGIC) -40 MG tablet Take 1 tablet by mouth every 4 hours as needed for headaches 15 tablet 0     finasteride (PROSCAR) 5 MG tablet Take 1/4 tab by mouth daily 45 tablet 3     fluticasone (FLONASE) 50 MCG/ACT nasal spray Spray 1 spray into both nostrils daily 16 g 1     LORazepam (ATIVAN) 0.5 MG tablet Take 1 tablet (0.5 mg) by mouth every 6 hours as needed for anxiety 10  tablet 0     Allergies   Allergen Reactions     Celexa [Citalopram]      shakiness     Zoloft [Sertraline]      Loss of libido       Reviewed and updated as needed this visit by clinical staff and provider     Review of Systems   Detailed as above       Objective    BP (!) 144/90 (BP Location: Right arm, Cuff Size: Adult Regular)   Pulse 93   Temp 98.6  F (37  C) (Tympanic)   Wt 66.7 kg (147 lb)   SpO2 98%   BMI 19.39 kg/m    Body mass index is 19.39 kg/m .  Physical Exam  Constitutional:       Appearance: Normal appearance.   Pulmonary:      Effort: Pulmonary effort is normal.   Neurological:      Mental Status: He is alert.   Psychiatric:         Mood and Affect: Mood normal.                Assessment & Plan     Lyme disease  Persistent symptoms after dx of lymes. He had facial and hand numbness that has resolved. He has follow-up with ID, which is great. He can also explore integrative medicine if he is interested.             No follow-ups on file.    FADUMO Telles CNP  [facility]

## 2020-12-15 ENCOUNTER — VIRTUAL VISIT (OUTPATIENT)
Dept: INFECTIOUS DISEASES | Facility: CLINIC | Age: 35
End: 2020-12-15
Attending: INTERNAL MEDICINE
Payer: COMMERCIAL

## 2020-12-15 DIAGNOSIS — A69.20 LYME DISEASE: Primary | ICD-10-CM

## 2020-12-15 DIAGNOSIS — G89.29 CHRONIC NONINTRACTABLE HEADACHE, UNSPECIFIED HEADACHE TYPE: ICD-10-CM

## 2020-12-15 DIAGNOSIS — R51.9 CHRONIC NONINTRACTABLE HEADACHE, UNSPECIFIED HEADACHE TYPE: ICD-10-CM

## 2020-12-15 DIAGNOSIS — G93.32 CHRONIC FATIGUE SYNDROME: ICD-10-CM

## 2020-12-15 PROCEDURE — 99204 OFFICE O/P NEW MOD 45 MIN: CPT | Mod: 95 | Performed by: INTERNAL MEDICINE

## 2020-12-15 ASSESSMENT — PAIN SCALES - GENERAL: PAINLEVEL: NO PAIN (0)

## 2020-12-15 NOTE — LETTER
"12/15/2020       RE: Braeden Escobar  [address]  [facility] [address]     Dear Colleague,    Thank you for referring your patient, Braeden Escobar, to the [facility] INFECTIOUS DISEASE CLINIC [place] at [facility]. Please see a copy of my visit note below.    Braeden Escobar is a 35 year old male who is being evaluated via a billable video visit.      The patient has been notified of following:     \"This video visit will be conducted via a call between you and your physician/provider. We have found that certain health care needs can be provided without the need for an in-person physical exam.  This service lets us provide the care you need with a video conversation.  If a prescription is necessary we can send it directly to your pharmacy.  If lab work is needed we can place an order for that and you can then stop by our lab to have the test done at a later time.    Video visits are billed at different rates depending on your insurance coverage.  Please reach out to your insurance provider with any questions.    If during the course of the call the physician/provider feels a video visit is not appropriate, you will not be charged for this service.\"    Patient has given verbal consent for Video visit? Yes  How would you like to obtain your AVS? MyChart  If you are dropped from the video visit, the video invite should be resent to: Send to e-mail at: [email or web address]  Will anyone else be joining your video visit? No  {If patient encounters technical issues they should call [phone] :[id number]}      Video-Visit Details    Type of service:  Video Visit    Video Start Time: {video visit start/end time:152948}  Video End Time: {video visit start/end time:152948}    Originating Location (pt. Location): {video visit patient location:709575::\"Home\"}    Distant Location (provider location):  [facility] INFECTI[facility] DISEASE CLINIC [place]     Platform used for " "Video Visit: {Virtual Visit Platforms:893268::\"Mauro\"}    {signature options:163037}            Again, thank you for allowing me to participate in the care of your patient.      Sincerely,    Onur Antonio MD      "

## 2020-12-15 NOTE — PROGRESS NOTES
"Braeden Escobar is a 35 year old male who is being evaluated via a billable video visit.      The patient has been notified of following:     \"This video visit will be conducted via a call between you and your physician/provider. We have found that certain health care needs can be provided without the need for an in-person physical exam.  This service lets us provide the care you need with a video conversation.  If a prescription is necessary we can send it directly to your pharmacy.  If lab work is needed we can place an order for that and you can then stop by our lab to have the test done at a later time.    Video visits are billed at different rates depending on your insurance coverage.  Please reach out to your insurance provider with any questions.    If during the course of the call the physician/provider feels a video visit is not appropriate, you will not be charged for this service.\"    Patient has given verbal consent for Video visit? Yes  How would you like to obtain your AVS? MyChart  If you are dropped from the video visit, the video invite should be resent to: Send to e-mail at: [email or web address]  Will anyone else be joining your video visit? No        Video-Visit Details    Type of service:  Video Visit  Video Time: 35 minutes  Originating Location (pt. Location): Home  Distant Location (provider location):  [facility] INFECT[facility] DISEASE CLINIC [place]   Platform used for Video Visit: [employer]  _______________________________________________________________________________________  Recommendations:  --No further need for antibiotics at this time  --Will refer to neurology for headaches  --Will give mental health referral for low mood    Onur Antonio MD  Infectious Disease  Pager [phone]    Problem List/Assessment:  1. Ongoing headaches  2. Recent diagnosis of lymes disease s/p 3 weeks of antibiotics    At this time, patient has no clinical evidence of active/acute lyme's disease. " Moreover he has received more than adequate course of antibiotics for 3 weeks. We will refer to neurology to evaluate for other/non-infectious causes of headaches. I explained that patient can experience chronic residual symptoms for weeks to months following acute lyme's infection. However, this does not represent and active/ongoing infection.  ______________________________________________________________________________________    HPI:    Patient is a 34 yo male with PMHx significant for recent diagnosis of Lyme's Disease who presents to the ID clinic with concerns of ongoing Lyme's infection.    Briefly to review relevant medical history,  Patient endorses ongoing frontal headaches and letahrgy since March of this year. CT head was done at the time that was negative. He was seen by ENT in May who referred him to neuro.    9/12/20 diagnosed with Lyme's disease-treated with 3 weeks of doxycyline.    Seen in ED 10/20/20. MRI brain was negative. Was advised to follow up with PCP.  ED visit 11/7/20 with c/o headaches.supportive management and discharged home.    Given ongoing symptoms, patient requested ID appointment for second opinion.   On our discussion today, he denies fevers/chills/sweats. No rashes. No facial paralysis, vision changes, weakness in arms/legs. No light headedness/dizziness/syncope. Endorses low energy but is able to carry out activities of daily living at home. Patient does endorse daily headaches with brain fog and fatigue. No cough/dyspnea/chest pain, abdominal pain/nausea/vomiting/diarrhea or urinary symptoms. No joint stiffness/swelling/redness/warmth.    ROS: 10 point ROS unremarkable unless stated otherwise in HPI.    Allergies:  Celexa [Citalopram]  Zoloft [Sertraline]     Medications:    Proscar     Past Medical History:    Genital herpes   Lyme disease    Anxiety  Cardiovascular Screening; LDL goal less than 160     Past Surgical History:    C Explore Undesc Testis, Abdominal     Family  History:    Anxiety Disorder     Social History:     Tobacco Use: Never  Alcohol Use: Yes  Drug Use: No    Exam: (limited exam on video)  General: well appearing, in no acute distress, up in chiar  HEENT: PERRLA, anicteric, no oral lesions  Chest: Normal work of breathing on room air  Abd: non distended  Skin: No rahs on face, neck, and upper arm  Neuro: Alert and oriented x3, grossly moving extremities    Labs: reviewed    12/22/2020 Anti-treponema Ab: negative  12/22/020 Hep C Ab negative  12/22/2020 HIV Ag/Ab negative  12/22/2020 Urine Gonorrhea/chlamydia PCR negative  9/12/2020 Lyme's IgG/IgM western blot Positive

## 2020-12-20 ENCOUNTER — HEALTH MAINTENANCE LETTER (OUTPATIENT)
Age: 35
End: 2020-12-20

## 2020-12-22 ENCOUNTER — PRE VISIT (OUTPATIENT)
Dept: INFECTIOUS DISEASES | Facility: CLINIC | Age: 35
End: 2020-12-22

## 2021-06-27 DIAGNOSIS — R51.9 SINUS HEADACHE: ICD-10-CM

## 2021-06-28 RX ORDER — FINASTERIDE 5 MG/1
TABLET, FILM COATED ORAL
Qty: 15 TABLET | Refills: 0 | Status: SHIPPED | OUTPATIENT
Start: 2021-06-28 | End: 2023-09-26

## 2021-08-18 ENCOUNTER — TRANSFERRED RECORDS (OUTPATIENT)
Dept: HEALTH INFORMATION MANAGEMENT | Facility: CLINIC | Age: 36
End: 2021-08-18

## 2021-09-02 ENCOUNTER — MEDICAL CORRESPONDENCE (OUTPATIENT)
Dept: HEALTH INFORMATION MANAGEMENT | Facility: CLINIC | Age: 36
End: 2021-09-02

## 2021-09-09 ENCOUNTER — TRANSFERRED RECORDS (OUTPATIENT)
Dept: HEALTH INFORMATION MANAGEMENT | Facility: CLINIC | Age: 36
End: 2021-09-09

## 2021-09-10 ENCOUNTER — LAB (OUTPATIENT)
Dept: LAB | Facility: CLINIC | Age: 36
End: 2021-09-10
Payer: COMMERCIAL

## 2021-09-10 DIAGNOSIS — R51.9 FACIAL PAIN: Primary | ICD-10-CM

## 2021-09-10 DIAGNOSIS — R53.83 FATIGUE: ICD-10-CM

## 2021-09-10 DIAGNOSIS — R51.9 FACIAL PAIN: ICD-10-CM

## 2021-09-10 PROCEDURE — 86628 CANDIDA ANTIBODY: CPT | Mod: 90

## 2021-09-10 PROCEDURE — 36415 COLL VENOUS BLD VENIPUNCTURE: CPT

## 2021-09-10 PROCEDURE — 86618 LYME DISEASE ANTIBODY: CPT

## 2021-09-10 PROCEDURE — 86611 BARTONELLA ANTIBODY: CPT | Mod: 90

## 2021-09-10 PROCEDURE — 86617 LYME DISEASE ANTIBODY: CPT | Mod: 90

## 2021-09-10 PROCEDURE — 86665 EPSTEIN-BARR CAPSID VCA: CPT

## 2021-09-10 PROCEDURE — 99000 SPECIMEN HANDLING OFFICE-LAB: CPT

## 2021-09-10 PROCEDURE — 86753 PROTOZOA ANTIBODY NOS: CPT | Mod: 90

## 2021-09-11 LAB — B BURGDOR IGG+IGM SER QL: 7.98

## 2021-09-12 LAB
B MICROTI IGG TITR SER: NORMAL {TITER}
B MICROTI IGM TITR SER: NORMAL {TITER}
B QUINTANA IGG TITR SER: NORMAL {TITER}
B QUINTANA IGM TITR SER: NORMAL {TITER}

## 2021-09-13 LAB
B BURGDOR IGG SER QL IB: POSITIVE
B BURGDOR IGM SER QL IB: NEGATIVE
EBV VCA IGM SER IA-ACNC: 24.5 U/ML
EBV VCA IGM SER IA-ACNC: NORMAL

## 2021-09-15 LAB
C ALBICANS IGA SER-ACNC: 2.39 EV
C ALBICANS IGG QN: 1.48 EV
C ALBICANS IGM SER-ACNC: 1.21 EV

## 2021-09-24 ENCOUNTER — TELEPHONE (OUTPATIENT)
Dept: FAMILY MEDICINE | Facility: CLINIC | Age: 36
End: 2021-09-24

## 2021-09-24 NOTE — TELEPHONE ENCOUNTER
Patient called regarding recent Inf disease test results.   Patient was advised by Inf Disease to follow up with PCP.     Last appointment with PCP:  3/25/2020 VV     Scheduled OV for patient to review with PCP next week.    Jessica SMALLS, RN      September 24, 2021  1:46 PM

## 2021-09-29 ENCOUNTER — OFFICE VISIT (OUTPATIENT)
Dept: FAMILY MEDICINE | Facility: CLINIC | Age: 36
End: 2021-09-29
Payer: COMMERCIAL

## 2021-09-29 VITALS
OXYGEN SATURATION: 98 % | TEMPERATURE: 99.1 F | BODY MASS INDEX: 18.16 KG/M2 | HEART RATE: 95 BPM | RESPIRATION RATE: 16 BRPM | HEIGHT: 73 IN | WEIGHT: 137 LBS | SYSTOLIC BLOOD PRESSURE: 118 MMHG | DIASTOLIC BLOOD PRESSURE: 85 MMHG

## 2021-09-29 DIAGNOSIS — B37.9 CANDIDA INFECTION: ICD-10-CM

## 2021-09-29 DIAGNOSIS — R53.82 CHRONIC FATIGUE: ICD-10-CM

## 2021-09-29 DIAGNOSIS — R51.9 CHRONIC NONINTRACTABLE HEADACHE, UNSPECIFIED HEADACHE TYPE: Primary | ICD-10-CM

## 2021-09-29 DIAGNOSIS — Z86.19 HISTORY OF LYME DISEASE: ICD-10-CM

## 2021-09-29 DIAGNOSIS — R41.89 BRAIN FOG: ICD-10-CM

## 2021-09-29 DIAGNOSIS — G89.29 CHRONIC NONINTRACTABLE HEADACHE, UNSPECIFIED HEADACHE TYPE: Primary | ICD-10-CM

## 2021-09-29 PROCEDURE — 99214 OFFICE O/P EST MOD 30 MIN: CPT | Performed by: PHYSICIAN ASSISTANT

## 2021-09-29 RX ORDER — AMITRIPTYLINE HYDROCHLORIDE 50 MG/1
50 TABLET ORAL DAILY
COMMUNITY
Start: 2021-08-18 | End: 2022-04-12

## 2021-09-29 RX ORDER — PROPRANOLOL HYDROCHLORIDE 20 MG/1
20 TABLET ORAL 2 TIMES DAILY
COMMUNITY
Start: 2021-08-18 | End: 2022-04-12

## 2021-09-29 RX ORDER — BUSPIRONE HYDROCHLORIDE 10 MG/1
10 TABLET ORAL DAILY PRN
COMMUNITY
Start: 2020-12-04 | End: 2022-04-12

## 2021-09-29 ASSESSMENT — PATIENT HEALTH QUESTIONNAIRE - PHQ9: SUM OF ALL RESPONSES TO PHQ QUESTIONS 1-9: 7

## 2021-09-29 ASSESSMENT — MIFFLIN-ST. JEOR: SCORE: 1605.31

## 2021-09-29 NOTE — MR AVS SNAPSHOT
After Visit Summary   3/1/2018    Braeden Escobar    MRN: [id number]           Patient Information     Date Of Birth          1985        Visit Information        Provider Department      3/1/2018 4:00 PM Alan Hodgson APRN CNP [facility]        Today's Diagnoses     Fatigue, unspecified type    -  1      Care Instructions    Think about getting a probiotic. I suggest the multiple organism kind that you keep in the fridge.  I recommend lactobacillus acidophilus (helps with reducing stomach acid) with bifido (helps with bowels)    Vitamin D 2000iu daily               Follow-ups after your visit        Who to contact     If you have questions or need follow up information about today's clinic visit or your schedule please contact [facility] directly at [phone].  Normal or non-critical lab and imaging results will be communicated to you by MyChart, letter or phone within 4 business days after the clinic has received the results. If you do not hear from us within 7 days, please contact the clinic through MyChart or phone. If you have a critical or abnormal lab result, we will notify you by phone as soon as possible.  Submit refill requests through [employer] or call your pharmacy and they will forward the refill request to us. Please allow 3 business days for your refill to be completed.          Additional Information About Your Visit        MyChart Information     [employer] gives you secure access to your electronic health record. If you see a primary care provider, you can also send messages to your care team and make appointments. If you have questions, please call your primary care clinic.  If you do not have a primary care provider, please call [phone] and they will assist you.        Care EveryWhere ID     This is your Care EveryWhere ID. This could be used by other organizations to access your [facility] medical records  [id number]        Your Vitals Were      Insulin U-500  Change insulin to 155 units with breakfast at 1 pm. 160 units supper 6-8 pm . Do not give insulin at bedtime, you may use 40 units at late night snack.   Results for orders placed or performed in visit on 09/29/21   POC Glycosylated Hemoglobin (Hb A1C)    Specimen: Blood   Result Value Ref Range    Hemoglobin A1C 7.1 %    Lot Number [id number]     Expiration Date 06/01/2023    POC Glucose, Blood    Specimen: Blood   Result Value Ref Range    Glucose 186 (A) 70 - 130 mg/dL    Lot Number [id number]     Expiration Date 04/14/2022       "Pulse Temperature Height Pulse Oximetry BMI (Body Mass Index)       85 97.7  F (36.5  C) (Oral) 6' 1\" (1.854 m) 96% 18.87 kg/m2        Blood Pressure from Last 3 Encounters:   03/01/18 117/64   01/17/18 110/72   12/22/17 108/88    Weight from Last 3 Encounters:   03/01/18 143 lb (64.9 kg)   01/17/18 146 lb (66.2 kg)   12/22/17 148 lb (67.1 kg)              Today, you had the following     No orders found for display       Primary Care Provider Office Phone # Fax #    Domenica Hutchison PA-C [phone] [address] [facility] [address]  [facility] [address]        Equal Access to Services     [facility] : Hadii quentin peraltao Solore, waaxda luqadaha, qaybta kaalmada adeegyada, emy herzog . So [facility] [phone].    ATENCIÓN: Si habla español, tiene a wynne disposición servicios gratuitos de asistencia lingüística. Marco al [phone].    We comply with applicable federal civil rights laws and [place] laws. We do not discriminate on the basis of race, color, national origin, age, disability, sex, sexual orientation, or gender identity.            Thank you!     Thank you for choosing [facility]  for your care. Our goal is always to provide you with excellent care. Hearing back from our patients is one way we can continue to improve our services. Please take a few minutes to complete the written survey that you may receive in the mail after your visit with us. Thank you!             Your Updated Medication List - Protect others around you: Learn how to safely use, store and throw away your medicines at www.disposemymeds.org.          This list is accurate as of 3/1/18  4:34 PM.  Always use your most recent med list.                   Brand Name Dispense Instructions for use Diagnosis    busPIRone 5 MG tablet    BUSPAR     Take 5 mg by mouth 3 times daily as needed        finasteride 5 MG tablet    PROSCAR     Take 5 mg by mouth daily        imiquimod 5 % cream    " ALDARA    12 packet    Apply a small sized amount to warts or molluscum three times weekly at bedtime.   Wash off after 8 hours.   May use for up to 16 weeks.    Genital warts       LORazepam 0.5 MG tablet    ATIVAN     Take 0.5-2 tablets by mouth 3 times daily as needed For anxiety        sertraline 50 MG tablet    ZOLOFT    90 tablet    TAKE 1 TABLET(50 MG) BY MOUTH DAILY    Anxiety       traZODone 50 MG tablet    DESYREL     Take 50 mg by mouth daily

## 2021-09-29 NOTE — PROGRESS NOTES
HPI: Braeden is a 35 yo male here with headaches since March of 2020  He has been seen by multiple specialists including ID, ENT, neurology and primary care clinics since that time. He has tried antibiotics, been seen in the ER, had CT and MRI of the brain  Reviewed records dating back to March of 2020 in Epic  Had a recent visit at the [facility] where the provider (Nadia MALDONADO/ Dr. Saini) ordered labs and referred him here to review the results.  Has positive candida Ab    Pt has hx of lyme dz in 2020  Took doxycycline x 3 weeks (ended in Nov) and didn't feel better.    With these HA (described as facial pain in the forehead) he has had worsening of his trouble focusing, feels like he has a brain fog. Has 1.5 years of fatigue.  He is taking propranolol and elavil for HA      Past Medical History:   Diagnosis Date     Chickenpox     As a child around age 5     Genital herpes      Lyme disease      Past Surgical History:   Procedure Laterality Date     C EXPLORE UNDESC TESTIS,ABDOMINAL       Social History     Tobacco Use     Smoking status: Never Smoker     Smokeless tobacco: Never Used   Substance Use Topics     Alcohol use: Yes     Alcohol/week: 2.0 - 3.0 standard drinks     Types: 2 - 3 Standard drinks or equivalent per week     Comment: occasional use     Current Outpatient Medications   Medication Sig Dispense Refill     amitriptyline (ELAVIL) 50 MG tablet Take 50 mg by mouth daily       busPIRone (BUSPAR) 10 MG tablet Take 10 mg by mouth daily as needed       butalbital-acetaminophen-caffeine (ESGIC) -40 MG tablet Take 1 tablet by mouth every 4 hours as needed for headaches 15 tablet 0     finasteride (PROSCAR) 5 MG tablet TAKE 1/4 TABLET BY MOUTH DAILY.  Due for appointment. Please schedule: [phone] 15 tablet 0     fluticasone (FLONASE) 50 MCG/ACT nasal spray Spray 1 spray into both nostrils daily 16 g 1     LORazepam (ATIVAN) 0.5 MG tablet Take 1 tablet (0.5 mg) by mouth every 6  "hours as needed for anxiety 10 tablet 0     propranolol (INDERAL) 20 MG tablet Take 20 mg by mouth 2 times daily       Allergies   Allergen Reactions     Celexa [Citalopram]      shakiness     Zoloft [Sertraline]      Loss of libido     FAMILY HISTORY NOTED AND REVIEWED    REVIEW OF SYSTEMS: HA, brain fog, fatigue    PHYSICAL EXAM:    /85 (BP Location: Left arm, Patient Position: Chair, Cuff Size: Adult Regular)   Pulse 95   Temp 99.1  F (37.3  C) (Temporal)   Resp 16   Ht 1.854 m (6' 1\")   Wt 62.1 kg (137 lb)   SpO2 98%   BMI 18.07 kg/m      Patient appears non toxic  Neuro: normal speech and gait.   Psych: approp affect and mood    Lyme IgG positive, lyme confirm IgM negative.  Babesia, EBV and B khan neg.  Candida Ab IgA, IgG and IgM elevated.    Assessment and Plan:     (R51.9,  G89.29) Chronic nonintractable headache, unspecified headache type  (primary encounter diagnosis)  Comment: I did call and talk to Dr. Saini who agrees to f/u with pt recd the pos candida antibody test.  Plan: pt working with [facility] of [facility] where he had testing for infection done. His lyme dz antibody test is high, but his IgM confirmation test was negative.  Suspect this due to previous, not current infection. He did complete a 3 week course of doxycycline last year which he feels was not helpful in alleviating this sxs.      (F48.8) Brain fog  Comment:   Plan: hasn't felt well since March 2020 and has seen several specialists. Time spent today reviewing these records.     (R53.82) Chronic fatigue  Comment:   Plan: as above    (Z86.19) History of Lyme disease  Comment:   Plan: he did see ID who did not feel additional tx necessary    (B37.9) Candida infection  Comment:   Plan: f/u with Dr. Saini as noted above.    Spent 30 minutes FTF with patient of which over 50% was spent discussing the coordination of care and management of their sxs.        Domenica Hutchison PA-C        "

## 2021-10-03 ENCOUNTER — HEALTH MAINTENANCE LETTER (OUTPATIENT)
Age: 36
End: 2021-10-03

## 2022-01-23 ENCOUNTER — HEALTH MAINTENANCE LETTER (OUTPATIENT)
Age: 37
End: 2022-01-23

## 2022-01-27 ENCOUNTER — NURSE TRIAGE (OUTPATIENT)
Dept: FAMILY MEDICINE | Facility: CLINIC | Age: 37
End: 2022-01-27
Payer: COMMERCIAL

## 2022-01-27 NOTE — TELEPHONE ENCOUNTER
Pt scheduled for tomorrow, per visit notes having abd pain x6 weeks. Called to triage     Appointments in Next Year    Jan 28, 2022  8:00 AM  (Arrive by 7:40 AM)  Provider Visit with Yvan Rosado MD  [facility] ([facility] ) [phone]        LOCATION/RADIATION? Location changes - unsure. Mainly on right side, will go right below rib dull pain but not consistent day to day. Now is totally fine. Occasionally to right below belly button. Sometimes can feel in lower back near pelvis. If he presses does not cause pain     Started about 6 weeks ago, changed diet. Doing keto-type diet for beginning of Oct, this started in December     Mother asked him if he was getting enough fiber     Denies fever vomiting, stool problems, or urine problems     Otherwise feels fine     No bloating or protruding     Triaged per Epic Triage Protocol, gave care advice which patient plans to follow.  See Care advice tab for more information.  Patent to call back if further questions or concerns.    Recommended keeping visit as scheduled unless develops any worsening pain or new symptoms overnight then go to ED     Tresa LAZCANO, Triage RN  [facility]         Reason for Disposition    Abdominal pain is a chronic symptom (recurrent or ongoing AND lasting > 4 weeks)    Additional Information    Negative: Passed out (i.e., fainted, collapsed and was not responding)    Negative: Shock suspected (e.g., cold/pale/clammy skin, too weak to stand, low BP, rapid pulse)    Negative: Sounds like a life-threatening emergency to the triager    Negative: Chest pain    Negative: Pain is mainly in upper abdomen (if needed ask: 'is it mainly above the belly button?')    Negative: SEVERE abdominal pain (e.g., excruciating)    Negative: Vomiting red blood or black (coffee ground) material    Negative: Bloody, black, or tarry bowel movements (Exception: chronic-unchanged black-grey  bowel movements and is taking iron pills or Pepto-bismol)    Negative: Unable to urinate (or only a few drops) and bladder feels very full    Negative: Pain in scrotum persists > 1 hour    Negative: Constant abdominal pain lasting > 2 hours    Negative: Vomiting bile (green color)    Negative: Patient sounds very sick or weak to the triager    Negative: Vomiting and abdomen looks much more swollen than usual    Negative: White of the eyes have turned yellow (i.e., jaundice)    Negative: Blood in urine (red, pink, or tea-colored)    Negative: Fever > 103 F (39.4 C)    Negative: Fever > 101 F (38.3 C) and over 60 years of age    Negative: Fever > 100.0 F (37.8 C) and has diabetes mellitus or a weak immune system (e.g., HIV positive, cancer chemotherapy, organ transplant, splenectomy, chronic steroids)    Negative: Fever > 100.0 F (37.8 C) and bedridden (e.g., nursing home patient, stroke, chronic illness, recovering from surgery)    Negative: MILD pain that comes and goes (cramps) lasts > 24 hours    Negative: Age > 60 years    Negative: Patient wants to be seen    Protocols used: ABDOMINAL PAIN - MALE-A-OH

## 2022-01-28 ENCOUNTER — OFFICE VISIT (OUTPATIENT)
Dept: FAMILY MEDICINE | Facility: CLINIC | Age: 37
End: 2022-01-28
Payer: COMMERCIAL

## 2022-01-28 VITALS
SYSTOLIC BLOOD PRESSURE: 112 MMHG | DIASTOLIC BLOOD PRESSURE: 73 MMHG | HEART RATE: 80 BPM | OXYGEN SATURATION: 98 % | WEIGHT: 143 LBS | TEMPERATURE: 97.5 F | BODY MASS INDEX: 18.95 KG/M2 | HEIGHT: 73 IN | RESPIRATION RATE: 16 BRPM

## 2022-01-28 DIAGNOSIS — R10.11 ABDOMINAL PAIN, RIGHT UPPER QUADRANT: Primary | ICD-10-CM

## 2022-01-28 LAB
ALBUMIN SERPL-MCNC: 4.2 G/DL (ref 3.4–5)
ALP SERPL-CCNC: 46 U/L (ref 40–150)
ALT SERPL W P-5'-P-CCNC: 34 U/L (ref 0–70)
ANION GAP SERPL CALCULATED.3IONS-SCNC: 2 MMOL/L (ref 3–14)
AST SERPL W P-5'-P-CCNC: 20 U/L (ref 0–45)
BILIRUB DIRECT SERPL-MCNC: 0.1 MG/DL (ref 0–0.2)
BILIRUB SERPL-MCNC: 0.4 MG/DL (ref 0.2–1.3)
BUN SERPL-MCNC: 15 MG/DL (ref 7–30)
CALCIUM SERPL-MCNC: 8.9 MG/DL (ref 8.5–10.1)
CHLORIDE BLD-SCNC: 108 MMOL/L (ref 94–109)
CO2 SERPL-SCNC: 27 MMOL/L (ref 20–32)
CREAT SERPL-MCNC: 1 MG/DL (ref 0.66–1.25)
GFR SERPL CREATININE-BSD FRML MDRD: >90 ML/MIN/1.73M2
GLUCOSE BLD-MCNC: 106 MG/DL (ref 70–99)
LIPASE SERPL-CCNC: 111 U/L (ref 73–393)
POTASSIUM BLD-SCNC: 4.2 MMOL/L (ref 3.4–5.3)
PROT SERPL-MCNC: 7.2 G/DL (ref 6.8–8.8)
SODIUM SERPL-SCNC: 137 MMOL/L (ref 133–144)
TSH SERPL DL<=0.005 MIU/L-ACNC: 1.69 MU/L (ref 0.4–4)

## 2022-01-28 PROCEDURE — 84443 ASSAY THYROID STIM HORMONE: CPT | Performed by: INTERNAL MEDICINE

## 2022-01-28 PROCEDURE — 82248 BILIRUBIN DIRECT: CPT | Performed by: INTERNAL MEDICINE

## 2022-01-28 PROCEDURE — 36415 COLL VENOUS BLD VENIPUNCTURE: CPT | Performed by: INTERNAL MEDICINE

## 2022-01-28 PROCEDURE — 80053 COMPREHEN METABOLIC PANEL: CPT | Performed by: INTERNAL MEDICINE

## 2022-01-28 PROCEDURE — 83690 ASSAY OF LIPASE: CPT | Performed by: INTERNAL MEDICINE

## 2022-01-28 PROCEDURE — 99214 OFFICE O/P EST MOD 30 MIN: CPT | Performed by: INTERNAL MEDICINE

## 2022-01-28 ASSESSMENT — PAIN SCALES - GENERAL: PAINLEVEL: NO PAIN (0)

## 2022-01-28 ASSESSMENT — MIFFLIN-ST. JEOR: SCORE: 1632.52

## 2022-01-28 NOTE — PATIENT INSTRUCTIONS
Blood tests now please    Schedule an ultrasound of the liver and gallbladder.    I suspect we'll be able to offer a lot of reassurance with these results.    Please let us know if the pain worsens!    We have ordered an imaging study.  Unless otherwise indicated, please call the number below to schedule.   [employer] [facility] Scheduling:    [phone]    Results will be conveyed by MyChart, telephone or letter.

## 2022-01-28 NOTE — PROGRESS NOTES
Assessment & Plan     Abdominal pain, right upper quadrant  Mostly right upper quadrant but occasionally right lower quadrant.  Symptoms have been ongoing for about 6 weeks but are described as quite mild.  History and exam is relatively nonspecific.  Therefore the differential is rather broad.  Ominous etiology is not suspected and the patient is reassured.  Constipation could explain his symptom constellation.  Especially in light of his recent efforts at increasing fiber intake.  However more recently he has been having regular bowel movements.  We will therefore obtain labs and a right upper quadrant ultrasound.  Although not classic, we will evaluate for gallstones.    Signs and symptoms of worsening and return precautions are discussed with the patient.    - Basic metabolic panel  (Ca, Cl, CO2, Creat, Gluc, K, Na, BUN)  - Hepatic panel (Albumin, ALT, AST, Bili, Alk Phos, TP)  - Lipase  - TSH with free T4 reflex  - US Abdomen Limited  - Basic metabolic panel  (Ca, Cl, CO2, Creat, Gluc, K, Na, BUN)  - Hepatic panel (Albumin, ALT, AST, Bili, Alk Phos, TP)  - Lipase  - TSH with free T4 reflex               See Patient Instructions    No follow-ups on file.    Yvan Rosado MD  [facility] ALESSIA Deras is a 36 year old who presents for the following health issues:    HPI     Right sided Abdominal pain.    Pt with abdominal discomfort for 6 weeks.  Often noted below the right rib.  Will sometimes move down to the RLQ. Manifests as the day goes on.  Not sharp. No association with PO intake.  No nausea nor emesis.  Has been on a near keto diet and had struggled with constipation. Has incorporated more fiber since December and notes some improvement in bowel habits. Last BM yesterday.  Normal.      Medications are noted.  Takes for headaches.  Hx Lyme reviewed.      Review of Systems   As per HPI      Objective    /73 (BP Location: Left arm, Patient Position: Chair, Cuff Size:  "Adult Regular)   Pulse 80   Temp 97.5  F (36.4  C) (Temporal)   Resp 16   Ht 1.854 m (6' 1\")   Wt 64.9 kg (143 lb)   SpO2 98%   BMI 18.87 kg/m    Body mass index is 18.87 kg/m .  Physical Exam   GENERAL: healthy, alert and no distress  RESP: lungs clear to auscultation - no rales, rhonchi or wheezes  CV: regular rate and rhythm, normal S1 S2, no S3 or S4, no murmur, click or rub, no peripheral edema and peripheral pulses strong  ABDOMEN: thin, soft, nontender, no hepatosplenomegaly, no masses and bowel sounds normal  MS: no gross musculoskeletal defects noted, no edema  PSYCH: mentation appears normal, judgement and insight intact and mildly anxious                "

## 2022-02-01 ENCOUNTER — TELEPHONE (OUTPATIENT)
Dept: FAMILY MEDICINE | Facility: CLINIC | Age: 37
End: 2022-02-01
Payer: COMMERCIAL

## 2022-02-01 DIAGNOSIS — R10.11 ABDOMINAL PAIN, RIGHT UPPER QUADRANT: Primary | ICD-10-CM

## 2022-02-01 NOTE — TELEPHONE ENCOUNTER
Pt called and is requesting abdomen ultrasound order be changed to [facility]. He spoke to his insurance and it will be cheaper at [facility] than [facility]. Routing to provider to change order.     Desiree JACOBSON RN  [facility]

## 2022-02-04 ENCOUNTER — TRANSFERRED RECORDS (OUTPATIENT)
Dept: HEALTH INFORMATION MANAGEMENT | Facility: CLINIC | Age: 37
End: 2022-02-04
Payer: COMMERCIAL

## 2022-02-08 ENCOUNTER — TELEPHONE (OUTPATIENT)
Dept: FAMILY MEDICINE | Facility: CLINIC | Age: 37
End: 2022-02-08
Payer: COMMERCIAL

## 2022-02-08 NOTE — TELEPHONE ENCOUNTER
Pt calling states had imaging done at [facility] last week and wondering what the results were?    Pt can be reached at [phone]    Keisha ZHU RN  EP Triage

## 2022-02-09 NOTE — TELEPHONE ENCOUNTER
Spoke to patient and gave him the labs and ultra sound results.     Amira Marie RN  -[facility]

## 2022-04-12 ENCOUNTER — TELEPHONE (OUTPATIENT)
Dept: FAMILY MEDICINE | Facility: CLINIC | Age: 37
End: 2022-04-12

## 2022-04-12 DIAGNOSIS — F41.9 ANXIETY: Primary | ICD-10-CM

## 2022-04-12 NOTE — TELEPHONE ENCOUNTER
Pt calling, to request refill of the following meds:  Buspar - takes this for anxiety  propranolol - takes this for migraines.  Amitriptyline - takes this for migraines, and anxiety.    These meds were last prescribed by Zoie Sun NP  With [facility]. At visit on: 08/18/2021.

## 2022-04-13 RX ORDER — AMITRIPTYLINE HYDROCHLORIDE 50 MG/1
50 TABLET ORAL AT BEDTIME
Qty: 30 TABLET | Refills: 2 | Status: SHIPPED | OUTPATIENT
Start: 2022-04-13 | End: 2022-06-09

## 2022-04-13 RX ORDER — PROPRANOLOL HYDROCHLORIDE 20 MG/1
20 TABLET ORAL 2 TIMES DAILY
Qty: 60 TABLET | Refills: 2 | Status: SHIPPED | OUTPATIENT
Start: 2022-04-13 | End: 2022-06-09

## 2022-04-13 RX ORDER — BUSPIRONE HYDROCHLORIDE 10 MG/1
10 TABLET ORAL EVERY 8 HOURS PRN
Qty: 180 TABLET | Refills: 2 | Status: SHIPPED | OUTPATIENT
Start: 2022-04-13 | End: 2022-09-22

## 2022-05-24 ENCOUNTER — TELEPHONE (OUTPATIENT)
Dept: FAMILY MEDICINE | Facility: CLINIC | Age: 37
End: 2022-05-24
Payer: COMMERCIAL

## 2022-05-24 DIAGNOSIS — A69.20 LYME DISEASE: Primary | ICD-10-CM

## 2022-05-24 NOTE — TELEPHONE ENCOUNTER
Patient called regarding treatment for lyme disease. Patient states he was referred to specialist Dr. Cook at [facility] in [place]. Was seen there yesterday, has not done lab work yet because this doctor is out of network for patient. Patient is unclear on what treatment would be provided through [facility]/if it could be done in clinic here.     Patient is wondering if treatment can be managed through PCP for cost purposes.     Callback: [phone]- ok to leave detailed VM     PCP- Can this be further managed in clinic? Please advise.     Lex Sosa RN  [facility]

## 2022-05-25 NOTE — TELEPHONE ENCOUNTER
He could see an infectious disease doctor. Looks like he had a positive test last year, but I did not do that testing.

## 2022-05-26 NOTE — TELEPHONE ENCOUNTER
PCP- please review    Pended infectious disease referral    Lex Sosa RN  [facility]

## 2022-06-01 ENCOUNTER — TELEPHONE (OUTPATIENT)
Dept: INFECTIOUS DISEASES | Facility: CLINIC | Age: 37
End: 2022-06-01
Payer: COMMERCIAL

## 2022-06-01 NOTE — TELEPHONE ENCOUNTER
Authorizing: Domenica Hutchison PA-C in  FAMILY PRAC/IM    Referral: [id number] (Referral NOT Required)       Expires: 5/26/2023 Priority: Routine: Next available opening   Diagnosis: Lyme      Please review and advise on scheduling.  Per patient states all records in [facility] and has Care Everywhere for [facility].

## 2022-06-01 NOTE — TELEPHONE ENCOUNTER
Pt asking for tx for lyme dz   Not sure if this from last year pos test  Ask him to make appt here first if ID won't see him so we can determine if he has active infection

## 2022-06-01 NOTE — TELEPHONE ENCOUNTER
Pt calling to follow up on below. Discussed that an infectious disease referral was placed. Transferred to general scheduling to set up appt with ID.

## 2022-06-02 NOTE — TELEPHONE ENCOUNTER
Domenica ,  We need to route to  [facility] instead of [facility] .    So I routed to them     Dr.Nasima Igor MD

## 2022-06-02 NOTE — TELEPHONE ENCOUNTER
Left voice message asking pt to call triage back. Please triage symptoms and help him schedule an appt if needed.    FYI-  He was referred to Infectious Disease - [place] 05/06/2022 for Lyme disease. If they can see him, please help him schedule appt with primary clinic.      2.06

## 2022-06-02 NOTE — TELEPHONE ENCOUNTER
Called patient is scheduled to see Domenica for virtual visit on 6/9 to determine lyme treatment need    Patient requesting you review the lyme tests done on 9/10 from different provider    Jose Carlos Moura RN

## 2022-06-09 ENCOUNTER — LAB (OUTPATIENT)
Dept: LAB | Facility: CLINIC | Age: 37
End: 2022-06-09

## 2022-06-09 ENCOUNTER — VIRTUAL VISIT (OUTPATIENT)
Dept: FAMILY MEDICINE | Facility: CLINIC | Age: 37
End: 2022-06-09
Payer: COMMERCIAL

## 2022-06-09 DIAGNOSIS — R41.3 MEMORY LOSS: ICD-10-CM

## 2022-06-09 DIAGNOSIS — F41.9 ANXIETY: ICD-10-CM

## 2022-06-09 DIAGNOSIS — R51.9 ACHING HEADACHE: Primary | ICD-10-CM

## 2022-06-09 DIAGNOSIS — R53.83 OTHER FATIGUE: ICD-10-CM

## 2022-06-09 LAB — ERYTHROCYTE [SEDIMENTATION RATE] IN BLOOD BY WESTERGREN METHOD: 3 MM/HR (ref 0–15)

## 2022-06-09 PROCEDURE — 85652 RBC SED RATE AUTOMATED: CPT

## 2022-06-09 PROCEDURE — 82607 VITAMIN B-12: CPT

## 2022-06-09 PROCEDURE — 36415 COLL VENOUS BLD VENIPUNCTURE: CPT

## 2022-06-09 PROCEDURE — 99214 OFFICE O/P EST MOD 30 MIN: CPT | Mod: 95 | Performed by: PHYSICIAN ASSISTANT

## 2022-06-09 PROCEDURE — 86140 C-REACTIVE PROTEIN: CPT

## 2022-06-09 RX ORDER — PROPRANOLOL HYDROCHLORIDE 20 MG/1
20 TABLET ORAL 2 TIMES DAILY
Qty: 60 TABLET | Refills: 2 | Status: SHIPPED | OUTPATIENT
Start: 2022-06-09 | End: 2022-09-22

## 2022-06-09 RX ORDER — AMITRIPTYLINE HYDROCHLORIDE 50 MG/1
50 TABLET ORAL AT BEDTIME
Qty: 90 TABLET | Refills: 3 | Status: SHIPPED | OUTPATIENT
Start: 2022-06-09 | End: 2024-03-27

## 2022-06-09 RX ORDER — FEXOFENADINE HCL 180 MG/1
180 TABLET ORAL DAILY
COMMUNITY
Start: 2022-06-09 | End: 2022-11-10

## 2022-06-09 RX ORDER — ESCITALOPRAM OXALATE 10 MG/1
10 TABLET ORAL DAILY
Qty: 30 TABLET | Refills: 1 | Status: SHIPPED | OUTPATIENT
Start: 2022-06-09 | End: 2022-07-08

## 2022-06-09 NOTE — PROGRESS NOTES
Braeden is a 37 year old who is being evaluated via a billable video visit.      How would you like to obtain your AVS? MyChart  If the video visit is dropped, the invitation should be resent by: Text to cell phone: [phone]  Will anyone else be joining your video visit? No      Video Start Time: 12:48 PM        Subjective   Braeden is a 37 year old who presents for the following health issues: concerns about lyme dz    HPI     Chief Complaint   Patient presents with     Lyme Disease     R/O     Pt states his new theory regarding his HA and other neurologic problems could be related to prev hx of lyme dz.  Wonders if this diet related  Dr. Saini tested him for candida which was pos  He was treated with antibiotics x 3 rounds (8 weeks) in 2020  Currently can't drink whole milk, but now more gluten sensitive and gets a HA/pressure.    Has a hard time focusing when he has a HA  He is anxiety worse and having this on a daily basis.  Feels okay when he wakes up in the morning, but as he eats he can get the HA  He is taking buspar    Review of Systems         Objective           Vitals:  No vitals were obtained today due to virtual visit.    Physical Exam   GENERAL: Healthy, alert and no distress  EYES: Eyes grossly normal to inspection.  No discharge or erythema, or obvious scleral/conjunctival abnormalities.  RESP: No audible wheeze, cough, or visible cyanosis.  No visible retractions or increased work of breathing.    SKIN: Visible skin clear. No significant rash, abnormal pigmentation or lesions.  NEURO: Cranial nerves grossly intact.  Mentation and speech appropriate for age.  PSYCH: Mentation appears normal, affect normal/bright, judgement and insight intact, normal speech and appearance well-groomed.      Assessment and Plan:     (R51.9) Aching headache  (primary encounter diagnosis)  Comment: Recd he f/u with neuro pending results.  Plan: has seen neuro and taking propranolol and elavil    (R53.83) Other  fatigue  Comment:   Plan: CRP, inflammation, ESR: Erythrocyte         sedimentation rate            (R41.3) Memory loss  Comment:   Plan: Vitamin B12            (F41.9) Anxiety  Comment:   Plan: start escitalopram (LEXAPRO) 10 MG tablet and f/u with her PCP one month              Domenica Hutchison PA-C              Video-Visit Details    Type of service:  Video Visit    Video End Time:1:13 PM    Originating Location (pt. Location): Home    Distant Location (provider location):  [facility]     Platform [facility] for Video Visit: [facility]

## 2022-06-10 DIAGNOSIS — F41.9 ANXIETY: ICD-10-CM

## 2022-06-10 LAB
CRP SERPL-MCNC: <2.9 MG/L (ref 0–8)
VIT B12 SERPL-MCNC: 732 PG/ML (ref 193–986)

## 2022-06-14 ENCOUNTER — TELEPHONE (OUTPATIENT)
Dept: FAMILY MEDICINE | Facility: CLINIC | Age: 37
End: 2022-06-14
Payer: COMMERCIAL

## 2022-06-14 RX ORDER — BUSPIRONE HYDROCHLORIDE 10 MG/1
10 TABLET ORAL EVERY 8 HOURS PRN
Qty: 180 TABLET | Refills: 2 | OUTPATIENT
Start: 2022-06-14

## 2022-06-14 NOTE — TELEPHONE ENCOUNTER
Rx denied to pharmacy. Called and spoke to the pharmacy staff. Script sent 4/13/22 for 180 tablets with 2 refills on file. Pharmacy confirmed the have received the script and patient still has 2 refills on file.     Nolvia Ochoa RN

## 2022-06-14 NOTE — TELEPHONE ENCOUNTER
The lexapro should be taking every morning for anxiety.  The amitriptyline should be taking every night for headaches.

## 2022-06-14 NOTE — TELEPHONE ENCOUNTER
Pt called clinic asking if he should be taking the escitalopram and amitriptyline together or one or the other. Pt asking for clarification on what medications he is supposed to be taking and what they are for/the purpose. Routing to provider for clarification.     Pt is okay with a [facility] message or a return phone call.    Can we leave a detailed message on this number? YES  Phone number patient can be reached at: Home number on file [phone] (home)    Evelyn Gutierrez RN  [facility] Triage

## 2022-06-16 NOTE — TELEPHONE ENCOUNTER
Writer sent patient [employer] message with provider's message below.    Arielle Orr RN  [facility]

## 2022-06-17 NOTE — TELEPHONE ENCOUNTER
Pt read MyChart message    Tresa LAZCANO, Triage RN  [facility]

## 2022-09-04 ENCOUNTER — HEALTH MAINTENANCE LETTER (OUTPATIENT)
Age: 37
End: 2022-09-04

## 2022-10-22 DIAGNOSIS — F41.9 ANXIETY: ICD-10-CM

## 2022-10-25 RX ORDER — PROPRANOLOL HYDROCHLORIDE 20 MG/1
TABLET ORAL
Qty: 60 TABLET | Refills: 0 | Status: SHIPPED | OUTPATIENT
Start: 2022-10-25 | End: 2022-11-22

## 2022-10-25 NOTE — TELEPHONE ENCOUNTER
PCP please review.    Sent message to Pharmacy to Please advise patient to schedule an appointment with a new PCP for further refills.    Please refill as appropriate.  Thank you,    Daniella Sadler RN  [facility]

## 2022-11-10 ENCOUNTER — TELEPHONE (OUTPATIENT)
Dept: PHARMACY | Facility: CLINIC | Age: 37
End: 2022-11-10

## 2022-11-10 RX ORDER — MULTIPLE VITAMINS W/ MINERALS TAB 9MG-400MCG
1 TAB ORAL DAILY
COMMUNITY

## 2022-11-10 RX ORDER — MULTIVITAMIN WITH IRON
1 TABLET ORAL DAILY
COMMUNITY

## 2022-11-10 RX ORDER — ERGOCALCIFEROL (VITAMIN D2) 10 MCG
400 TABLET ORAL DAILY
COMMUNITY

## 2022-11-10 NOTE — TELEPHONE ENCOUNTER
"Received request from Carine Saucedo, clinic manager, to call patient regarding medication concerns.    I spoke with patient - his main concern is ongoing headaches and brain fog following diagnosis of Lyme disease in 2020.  Per review of patient's chart, he has worked with primary care physician, ID and neurology regarding this.  Lyme disease was treated and felt to be treated successfully.  He wants to make sure that the medications he's taking are not making symptoms worse.  Of note, he reports having Lyme symptoms for 8-9 months prior to diagnosis.  Labs have been monitored extensively by neurology - he reports these have all been normal (vitamins, thyroid, etc) and he has had imaging done as well.    He's currently taking amitriptyline 50mg at bedtime for headache prophylaxis and propranolol 20mg twice daily for anxiety and headache prophylaxis.  Headaches have overall improved over the last 2 years, but are still occurring frequently.  He also has noticed some foods trigger headaches (gluten/dairy/sugar) so at this point is basically only eating proteins and vegetables.  He describes headaches as pressure building, then leading to headache in the front of his face.  He did also receive Botox injections with neurology for headaches, but didn't find these helpful.    Additionally, he takes buspirone 10mg twice daily for anxiety and finasteride 1.25mg daily for hair loss.  He feels anxiety is stable and he denies any depression symptoms.    He has also started several supplements hoping these would help - Vitamin D 400 international unit(s) daily, a daily multivitamin, fish oil 650mg daily, curcumin 1350mg daily, Lion's Jorge daily and a vitamin B complex.  He says \"it's hard to know if these are helping, but they aren't hurting.\"    He has tried stopping/tapering off Rx medications in the past, which worsened his headaches.    In addition to medications, he does exercise 2-3 times per week, does yoga and tries to " be proactive in making improvements in his health.  He sleeps 8-9 hours/night and denies snoring/apnea events.     Braeden and I discussed that his medications seem to be appropriate.  Amitriptyline can contribute to brain fog, but seems to be helping his headaches as he's noticed worsened symptoms if he misses a dose.  He is due to establish with a new primary care physician as Domenica has changed roles in the clinic, he agrees.  Some patients see benefits from magnesium supplementation in preventing headaches, may benefit from trial of magnesium 400mg daily x 4-6 weeks to see if symptoms improve - if not, can be stopped.  I will also ask my ID colleague if there is a specialist in the area who specializes in patients with persistent symptoms following treatment for Lyme disease and will get back to patient via [employer] next week.    Heydi Colindres, PharmD, [facility]  Medication Therapy Management Provider  Pager: [phone]

## 2022-11-17 ENCOUNTER — TELEPHONE (OUTPATIENT)
Dept: FAMILY MEDICINE | Facility: CLINIC | Age: 37
End: 2022-11-17

## 2022-11-17 NOTE — TELEPHONE ENCOUNTER
Pt called, needs to est with new PCP as Domenica is now acute care, scheduled with new PCP     Tresa LAZCANO, Triage RN  [facility]

## 2022-11-18 ENCOUNTER — VIRTUAL VISIT (OUTPATIENT)
Dept: FAMILY MEDICINE | Facility: CLINIC | Age: 37
End: 2022-11-18
Payer: COMMERCIAL

## 2022-11-18 DIAGNOSIS — Z76.89 ENCOUNTER TO ESTABLISH CARE: Primary | ICD-10-CM

## 2022-11-18 DIAGNOSIS — F41.9 ANXIETY: ICD-10-CM

## 2022-11-18 DIAGNOSIS — R51.9 CHRONIC NONINTRACTABLE HEADACHE, UNSPECIFIED HEADACHE TYPE: ICD-10-CM

## 2022-11-18 DIAGNOSIS — G89.29 CHRONIC NONINTRACTABLE HEADACHE, UNSPECIFIED HEADACHE TYPE: ICD-10-CM

## 2022-11-18 PROCEDURE — 99214 OFFICE O/P EST MOD 30 MIN: CPT | Mod: 95 | Performed by: PHYSICIAN ASSISTANT

## 2022-11-18 NOTE — PROGRESS NOTES
Braeden is a 37 year old who is being evaluated via a billable video visit.      How would you like to obtain your AVS? MyChart  If the video visit is dropped, the invitation should be resent by: Text to cell phone: [phone] DOX  Will anyone else be joining your video visit? No      Assessment & Plan     Encounter to establish care  Chronic nonintractable headache, unspecified headache type  Anxiety    Lengthy discussion had in regards to his chronic migraines. Plan to discuss with Heydi about different medication options including potentially tapering off Amitriptyline. Possibility would benefit from CGRP antagonists (Nurtec etc).     We discussed supplements and efficacy for Migraine prophylaxis  Recommended:       Magnesium 400 mg daily  Riboflavin (Vitamin B2) 400 mg daily  Coenzyme Q 100 mg three times daily    Discontinue Kindra Hirsch. Continue other medications for now. Plan to follow-up with him after speaking with Heydi.                No follow-ups on file.    The likelihood of other entities in the differential is insufficient to justify any further testing for them at this time. This was explained to the patient. The patient was advised that persistent or worsening symptoms would require further evaluation. Patient advised to call the office and if unable to reach to go to the emergency room if they develop any new or worsening symptoms. Expressed understanding and agreement with above stated plan.       DEYANIRA [facility]    Catherine Deras is a 37 year old, presenting for the following health issues:  Establish Care    Presents to establish care with me as new PCP.   Ongoing headaches and brain fog which he endorses following Lyme disease diagnosis. He worked closely with his PCP, ID, and neurology following this. Treated x 3. MRI eval x 2. Notes potential very slow/gradual improvement in his symptoms but has debilitating frontal migraine headaches  almost daily. Starts a pressure in the center of his forehead and gradually builds up. Tingling feeling as well. Triggers include sugar and gluten. Diet consists of veggies/protein. Exercises 3-4 times a week + yoga.    Uses Amitriptyline 50 mg HS for prophylaxis and propanolol 20 mg BID. Tried Botox, fiorect in the past and maybe gabapentin.     Hx of anxiety and depression that is stable currently on Buspar but has tried selective serotonin reuptake inhibitor/SNRI previously.     Reviewed blood work and scans which have been unremarkable.      Recently saw our clinical Pharmacist Heydi Colindres who started him on Magnesium 400 mg daily. Discussed potential for Amitriptyline to be contributing to his brain fog as well which is a good point.      Review of Systems   Constitutional, HEENT, cardiovascular, pulmonary, GI, , musculoskeletal, neuro, skin, endocrine and psych systems are negative, except as otherwise noted.      Objective         Vitals:  No vitals were obtained today due to virtual visit.    Allergies   Allergen Reactions     Celexa [Citalopram]      shakiness     Zoloft [Sertraline]      Loss of libido     Current Outpatient Medications   Medication Sig Dispense Refill     amitriptyline (ELAVIL) 50 MG tablet Take 1 tablet (50 mg) by mouth At Bedtime 90 tablet 3     busPIRone (BUSPAR) 10 MG tablet TAKE 1 TABLET (10 MG) BY MOUTH EVERY 8 HOURS AS NEEDED (ANXIETY) MAX 3/DAY. 270 tablet 0     ergocalciferol (VITAMIN D2) 400 units (10 mcg) TABS tablet Take 400 Units by mouth daily       finasteride (PROSCAR) 5 MG tablet TAKE 1/4 TABLET BY MOUTH DAILY.  Due for appointment. Please schedule: [phone] 15 tablet 0     multivitamin w/minerals (THERA-VIT-M) tablet Take 1 tablet by mouth daily       Omega-3 Fatty Acids (ALASKA WILD FISH OIL) 650 MG CPDR Take 650 mg by mouth daily       propranolol (INDERAL) 20 MG tablet TAKE 1 TABLET BY MOUTH TWICE A DAY 60 tablet 0     TURMERIC CURCUMIN PO Take 1,350 mg by  mouth daily       UNABLE TO FIND MEDICATION NAME: Lion's Jorge 500mg daily       vitamin C with B complex (B COMPLEX-C) tablet Take 1 tablet by mouth daily       Past Medical History:   Diagnosis Date     Chickenpox     As a child around age 5     Genital herpes      Lyme disease      Past Surgical History:   Procedure Laterality Date     ZZC EXPLORE UNDESC TESTIS,ABDOMINAL           Physical Exam   GENERAL: Healthy, alert and no distress  EYES: Eyes grossly normal to inspection.  No discharge or erythema, or obvious scleral/conjunctival abnormalities.  RESP: No audible wheeze, cough, or visible cyanosis.  No visible retractions or increased work of breathing.    SKIN: Visible skin clear. No significant rash, abnormal pigmentation or lesions.  NEURO: Cranial nerves grossly intact.  Mentation and speech appropriate for age.  PSYCH: Mentation appears normal, affect normal/bright, judgement and insight intact, normal speech and appearance well-groomed.      Video-Visit Details    Video Start Time: 4:00 pm    Type of service:  Video Visit    Video End Time:     Originating Location (pt. Location): Home        Distant Location (provider location):  On-site    Platform used for Video Visit: Jc

## 2022-11-22 RX ORDER — PROPRANOLOL HYDROCHLORIDE 20 MG/1
TABLET ORAL
Qty: 60 TABLET | Refills: 0 | Status: SHIPPED | OUTPATIENT
Start: 2022-11-22 | End: 2023-01-11

## 2022-11-22 NOTE — TELEPHONE ENCOUNTER
Routing refill request to provider for review/approval because:  Last filled by glenny routing to new pcp to review     Jose Carlos Moura RN  [facility] Triage Nurse

## 2022-12-15 DIAGNOSIS — F41.9 ANXIETY: ICD-10-CM

## 2022-12-15 RX ORDER — BUSPIRONE HYDROCHLORIDE 10 MG/1
10 TABLET ORAL EVERY 8 HOURS PRN
Qty: 270 TABLET | Refills: 0 | Status: SHIPPED | OUTPATIENT
Start: 2022-12-15 | End: 2023-06-19

## 2023-01-11 DIAGNOSIS — F41.9 ANXIETY: ICD-10-CM

## 2023-01-11 RX ORDER — PROPRANOLOL HYDROCHLORIDE 20 MG/1
20 TABLET ORAL 2 TIMES DAILY
Qty: 180 TABLET | Refills: 1 | Status: SHIPPED | OUTPATIENT
Start: 2023-01-11 | End: 2024-01-11

## 2023-01-11 NOTE — TELEPHONE ENCOUNTER
Patient is out of medications. He thought the pharmacy had contacted the clinic.     Amira Marie RN  [facility]

## 2023-03-22 ENCOUNTER — TELEPHONE (OUTPATIENT)
Dept: INFECTIOUS DISEASES | Facility: CLINIC | Age: 38
End: 2023-03-22
Payer: COMMERCIAL

## 2023-03-22 NOTE — TELEPHONE ENCOUNTER
[facility] Call Center    Phone Message    May a detailed message be left on voicemail: yes     Reason for Call: Other: patient saw neurology and had tests ran again for lyme's he states that, that test came back positive, he is under the impression that after treatment for lyme'sthis should not be positive. patient is asking if the provider could please review those recent test results and assess them for him. please advise thank you      Action Taken: Other: ID    Travel Screening: Not Applicable                                                                      
Patient has not yet been seen by our clinic and will establish care on 4/5. Can discuss at that visit.  
Constitutional: No fever or chills  Eyes: No visual changes  HEENT: No throat pain  CV: No chest pain  Resp: No SOB no cough  GI: No abd pain, nausea or vomiting  : No dysuria  MSK: No musculoskeletal pain  Skin: No rash  Neuro: No headache

## 2023-03-24 NOTE — TELEPHONE ENCOUNTER
RECORDS RECEIVED FROM: CE/Internal   DATE RECEIVED: 4.5.23   NOTES (Gather within 2 years) STATUS DETAILS   OFFICE NOTE from referring provider   CE 3.13.23  [facility]   OFFICE NOTE from other specialist Internal 12.15.20  Marisela ROCHE   DISCHARGE SUMMARY from hospital     DISCHARGE REPORT from the ER     LABS (any labs) [facility]/Internal    MEDICATION LIST [facility]/Internal    IMAGING  (NEED IMAGES AND REPORTS)     Osteomyelitis: Foot imaging      Liver Abscess: Abdominal imagineg     Other (anything related to diagnoses

## 2023-04-05 ENCOUNTER — PRE VISIT (OUTPATIENT)
Dept: INFECTIOUS DISEASES | Facility: CLINIC | Age: 38
End: 2023-04-05
Payer: COMMERCIAL

## 2023-04-05 ENCOUNTER — OFFICE VISIT (OUTPATIENT)
Dept: INFECTIOUS DISEASES | Facility: CLINIC | Age: 38
End: 2023-04-05
Attending: STUDENT IN AN ORGANIZED HEALTH CARE EDUCATION/TRAINING PROGRAM
Payer: COMMERCIAL

## 2023-04-05 VITALS
OXYGEN SATURATION: 98 % | WEIGHT: 139.9 LBS | HEIGHT: 73 IN | HEART RATE: 66 BPM | DIASTOLIC BLOOD PRESSURE: 73 MMHG | TEMPERATURE: 97.4 F | SYSTOLIC BLOOD PRESSURE: 122 MMHG | BODY MASS INDEX: 18.54 KG/M2

## 2023-04-05 DIAGNOSIS — R76.8 POSITIVE LYME DISEASE SEROLOGY: Primary | ICD-10-CM

## 2023-04-05 PROCEDURE — G0463 HOSPITAL OUTPT CLINIC VISIT: HCPCS | Performed by: INTERNAL MEDICINE

## 2023-04-05 PROCEDURE — 99215 OFFICE O/P EST HI 40 MIN: CPT | Performed by: INTERNAL MEDICINE

## 2023-04-05 ASSESSMENT — PAIN SCALES - GENERAL: PAINLEVEL: NO PAIN (0)

## 2023-04-05 NOTE — PROGRESS NOTES
"Braeden Escobar is here today for a complaint of positive serology      Assessment & Plan/Recommendations     ID problem list:  1. Positive Lyme IgG (negative IgM)  2. History of treated Lyme disease (9/2020)    Discussion:   Patient's Lyme serologies with positive IgG and negative IgM are similar to when last checked in 2021 and 2020, and overall are most consistent with prior infection. The IgG often can remain positive for many years (irregardless of antibiotic therapy) and is not necessarily indicative of new nor ongoing infection. He was treated with 3-week course of doxycycline in 2020 and has not had any repeat known tick exposures, erythema migrans rashes, febrile illnesses, nor worsening of his prior neuro symptoms to suggest any new Lyme infection. Thus, no further antibiotic treatment indicated at this time as per IDSA/AAN/ACR 2020 guidelines \"For patients who have persistent or recurring nonspecific symptoms such as fatigue, pain, or cognitive impairment following recommended treatment for Lyme disease, but who lack objective evidence of reinfection or treatment failure, we recommend against additional antibiotic therapy.\" Thus would continue supportive care and symptomatic managment as per primary care and neurology, and could consider psych evaluation as appropriate. Would also suggest routine screenings for sexually active adult, including syphilis, HIV (have not been screened in our system since 2017, unclear if has been checked elsewhere since then) with his next set of routine labs.     Recs:  - continue to follow with neurology and primary care for symptomatic management; could consider psych involvement as well as indicated  - with next labs would suggest routine syphilis and HIV screenings (has not been done in our system since 2017)  - follow up in ID clinic PRN signs/symptoms of infection      I communicated with the patient and his mother at this visit.      Patient was seen on 04/05/23 in ID " clinic.    60 minutes spent by me on the date of the encounter doing chart review, history and exam, documentation and further activities per the note      Alfredito Solano MD  Infectious Diseases      Subjective       HPI:  Braeden Escobar is a 37 year old male with PMH including previously diagnosed Lyme disease (treated 9/2020 with 3-wks course doxycyline), anxiety, chronic frontal headaches, HSV, who presents to ID clinic as referral from his primary care provider (Domenica Hutchison) for positive Lyme IgG.     He says he previously followed with [place] physicians and then transferred to [facility] for primary care and neurology care.  He says prior to being treated for Lyme in 2020 he was initially on empiric antibiotics/steroids for possible tooth infection, ear infection, & sinus infection prior to diagnosis of Lyme; he also underwent allergy testing around that time that was unrevealing. He says at that time he was having headaches and so he saw a neurologist. He got CT head and MRI brain which were negative. He says Lyme was only thing that was positive at the time and so he was given 3-weeks of oral doxycyline which he completed as prescribed at that time. He then saw ID physician Dr. Goldman at [facility] in 12/2020 who said he was appropriately treated for Lyme so no further antibiotics were given at that time.     Patient currently follows at [facility] with neurologist (Joy Scott) for his chronic headaches (MRI reportedly normal) and has been on propranolol which has helped somewhat for this. She repeated his blood work and tested Lyme and it was positive IgG (but negative IgM) similar to when last checked in 2021 and 2020; he was referred to ID clinic to help interpret these serologies.     He says he had intermittent headaches and memory issues since 2020, that the headaches and cognitive issues slightly improve every year since 2020, but have still not completely gone back to his baseline yet. He says  still not as sharp in terms of memory and concentration. He says the medication helps somewhat and that his lifestyle and dietary changes have helped somewhat also -- he says his symptoms improved with dietary and medication changes, and that his symptoms worsen when he stops. In terms of dietary changes, he no longer consumes sugars, gluten, nor processed foods; he says when he changes his diet his symptoms worsen.     In terms of exposure history - works as  at [employer], works from home since 2020. Never diagnosed/treated for COVID that he's aware of. Lives in [place] with girlfriend, 1 dog (x1 year), never lived on farm, last travel 9/2022 ([place], [place], [place]), prior to that nothing recent (last in [place] was [place] in 2020) or to visit parents (parents live in woods in [place], and he [place] in [place] and hiking, never saw/removed tick that he recalls, never bullseye rash. No new rashes or tick bites since last treated for Lyme in 2020; not much camping since then.     ROS - no fevers/chills, no night sweats, no rashes. No joint pains (only had briefly right knee pain for few weeks in 2020 prior to doxycyline course, then resolved), no other new neurologic symptoms aside from those listed above; no facial paralysis.        PAST MEDICAL HISTORY  Past Medical History:   Diagnosis Date     Chickenpox     As a child around age 5     Genital herpes      Lyme disease      Otherwise as per HPI    PAST SURGICAL HISTORY  Past Surgical History:   Procedure Laterality Date     ZZC EXPLORE UNDESC TESTIS,ABDOMINAL       Otherwise as per HPI    ALLERGIES AND DRUG REACTIONS  Allergies   Allergen Reactions     Celexa [Citalopram]      shakiness     Zoloft [Sertraline]      Loss of libido       FAMILY HISTORY  No known immunocompromising conditions or infections unless listed below  family history includes Anxiety Disorder in his mother; Cancer - colorectal (age of onset: 50) in his  "maternal grandfather; Heart Disease in his paternal grandfather.    SOCIAL AND FUNCTIONAL HISTORY  Social History     Tobacco Use     Smoking status: Never     Smokeless tobacco: Never   Substance Use Topics     Alcohol use: Yes     Alcohol/week: 2.0 - 3.0 standard drinks of alcohol     Types: 2 - 3 Standard drinks or equivalent per week     Comment: occasional use     Drug use: No   [place]  1 dog (x1 year)  Works as   Otherwise as per HPI    CURRENT ANTIBIOTICS  Other medications reviewed in EPIC  n/a    REVIEW OF SYSTEMS  10 point ROS obtained, pertinent positives and negatives as above.      Objective   PHYSICAL EXAMINATION     height is 1.854 m (6' 1\") and weight is 63.5 kg (139 lb 14.4 oz). His oral temperature is 97.4  F (36.3  C). His blood pressure is 122/73 and his pulse is 66. His oxygen saturation is 98%.     Constitutional:  appearance not in distress   Eyes: no conjunctival injection, no scleral icterus  Pulmonary: unlabored breathing on room air  Gastrointestinal: abdomen non-distended   Musculoskeletal: normal bulk and tone  Skin: no rashes visible on exposed skin  Neurologic: awake, alert and interactive, moving all extremities, no facial asymmetry     Other Significant Information (Labs, cultures, radiology, etc)    Recent micro reviewed:   12/22/17 HCV screen: negative  12/22/17 HIV screen: negative  12/22/17 gonorrhea/chlamydia urine: negative  12/22/17 syphilis screen: negative  10/31/20 [facility]) anaplasma IgG negative, IgM negative  10/31/20 [facility] ([place]) babesia IgG negative, IgM negative  10/31/20 [facility] ([place]) ehrlichia IgG negative, IgM negative  10/31/20 [facility]) Lyme IgG positive, IgM negative  9/10/21 Lyme serologies: IgG positive, IgM negative  3/15/23 [facility] ([facility]) Lyme serologies: IgG positive, IgM negative        "

## 2023-04-05 NOTE — LETTER
"4/5/2023       RE: Braeden Escobar  [address]  [place] [address]     Dear Colleague,    Thank you for referring your patient, Braeden Escobar, to the [facility] INFECTIOUS DISEASE CLINIC [place] [facility]. Please see a copy of my visit note below.    Braeden Escobar is here today for a complaint of positive serology      Assessment & Plan/Recommendations     ID problem list:  1. Positive Lyme IgG (negative IgM)  2. History of treated Lyme disease (9/2020)    Discussion:   Patient's Lyme serologies with positive IgG and negative IgM are similar to when last checked in 2021 and 2020, and overall are most consistent with prior infection. The IgG often can remain positive for many years (irregardless of antibiotic therapy) and is not necessarily indicative of new nor ongoing infection. He was treated with 3-week course of doxycycline in 2020 and has not had any repeat known tick exposures, erythema migrans rashes, febrile illnesses, nor worsening of his prior neuro symptoms to suggest any new Lyme infection. Thus, no further antibiotic treatment indicated at this time as per IDSA/AAN/ACR 2020 guidelines \"For patients who have persistent or recurring nonspecific symptoms such as fatigue, pain, or cognitive impairment following recommended treatment for Lyme disease, but who lack objective evidence of reinfection or treatment failure, we recommend against additional antibiotic therapy.\" Thus would continue supportive care and symptomatic managment as per primary care and neurology, and could consider psych evaluation as appropriate. Would also suggest routine screenings for sexually active adult, including syphilis, HIV (have not been screened in our system since 2017, unclear if has been checked elsewhere since then) with his next set of routine labs.     Recs:  - continue to follow with neurology and primary care for symptomatic management; could " consider psych involvement as well as indicated  - with next labs would suggest routine syphilis and HIV screenings (has not been done in our system since 2017)  - follow up in ID clinic PRN signs/symptoms of infection      I communicated with the patient and his mother at this visit.      Patient was seen on 04/05/23 in ID clinic.    60 minutes spent by me on the date of the encounter doing chart review, history and exam, documentation and further activities per the note      Alfredito Solano MD  Infectious Diseases      Subjective       HPI:  Braeden Escobar is a 37 year old male with PMH including previously diagnosed Lyme disease (treated 9/2020 with 3-wks course doxycyline), anxiety, chronic frontal headaches, HSV, who presents to ID clinic as referral from his primary care provider (Domenica Hutchison) for positive Lyme IgG.     He says he previously followed with [facility] physicians and then transferred to [place] [facility] for primary care and neurology care.  He says prior to being treated for Lyme in 2020 he was initially on empiric antibiotics/steroids for possible tooth infection, ear infection, & sinus infection prior to diagnosis of Lyme; he also underwent allergy testing around that time that was unrevealing. He says at that time he was having headaches and so he saw a neurologist. He got CT head and MRI brain which were negative. He says Lyme was only thing that was positive at the time and so he was given 3-weeks of oral doxycyline which he completed as prescribed at that time. He then saw ID physician Dr. Goldman at [facility] in 12/2020 who said he was appropriately treated for Lyme so no further antibiotics were given at that time.     Patient currently follows at [facility] with neurologist (Joy Scott) for his chronic headaches (MRI reportedly normal) and has been on propranolol which has helped somewhat for this. She repeated his blood work and tested Lyme and it was positive IgG (but negative IgM) similar  to when last checked in 2021 and 2020; he was referred to ID clinic to help interpret these serologies.     He says he had intermittent headaches and memory issues since 2020, that the headaches and cognitive issues slightly improve every year since 2020, but have still not completely gone back to his baseline yet. He says still not as sharp in terms of memory and concentration. He says the medication helps somewhat and that his lifestyle and dietary changes have helped somewhat also -- he says his symptoms improved with dietary and medication changes, and that his symptoms worsen when he stops. In terms of dietary changes, he no longer consumes sugars, gluten, nor processed foods; he says when he changes his diet his symptoms worsen.     In terms of exposure history - works as  at [employer] company, works from home since 2020. Never diagnosed/treated for COVID that he's aware of. Lives in [place] with girlfriend, 1 dog (x1 year), never lived on farm, last travel 9/2022 ([place], [place], [place]), prior to that nothing recent (last in [place] was [place] in [place]) or to visit parents (parents live in woods in [place], and he goes [place] in [place], never saw/removed tick that he recalls, never bullseye rash. No new rashes or tick bites since last treated for Lyme in 2020; not much camping since then.     ROS - no fevers/chills, no night sweats, no rashes. No joint pains (only had briefly right knee pain for few weeks in 2020 prior to doxycyline course, then resolved), no other new neurologic symptoms aside from those listed above; no facial paralysis.        PAST MEDICAL HISTORY  Past Medical History:   Diagnosis Date    Chickenpox     As a child around age 5    Genital herpes     Lyme disease      Otherwise as per HPI    PAST SURGICAL HISTORY  Past Surgical History:   Procedure Laterality Date    ZZC EXPLORE UNDESC TESTIS,ABDOMINAL       Otherwise as per HPI    ALLERGIES AND DRUG  "REACTIONS  Allergies   Allergen Reactions    Celexa [Citalopram]      shakiness    Zoloft [Sertraline]      Loss of libido       FAMILY HISTORY  No known immunocompromising conditions or infections unless listed below  family history includes Anxiety Disorder in his mother; Cancer - colorectal (age of onset: 50) in his maternal grandfather; Heart Disease in his paternal grandfather.    SOCIAL AND FUNCTIONAL HISTORY  Social History     Tobacco Use    Smoking status: Never    Smokeless tobacco: Never   Substance Use Topics    Alcohol use: Yes     Alcohol/week: 2.0 - 3.0 standard drinks of alcohol     Types: 2 - 3 Standard drinks or equivalent per week     Comment: occasional use    Drug use: No   lives in [place] with girlfriend  1 dog (x1 year)  Works as   Otherwise as per HPI    CURRENT ANTIBIOTICS  Other medications reviewed in EPIC  n/a    REVIEW OF SYSTEMS  10 point ROS obtained, pertinent positives and negatives as above.      Objective   PHYSICAL EXAMINATION     height is 1.854 m (6' 1\") and weight is 63.5 kg (139 lb 14.4 oz). His oral temperature is 97.4  F (36.3  C). His blood pressure is 122/73 and his pulse is 66. His oxygen saturation is 98%.     Constitutional:  appearance not in distress   Eyes: no conjunctival injection, no scleral icterus  Pulmonary: unlabored breathing on room air  Gastrointestinal: abdomen non-distended   Musculoskeletal: normal bulk and tone  Skin: no rashes visible on exposed skin  Neurologic: awake, alert and interactive, moving all extremities, no facial asymmetry     Other Significant Information (Labs, cultures, radiology, etc)    Recent micro reviewed:   12/22/17 HCV screen: negative  12/22/17 HIV screen: negative  12/22/17 gonorrhea/chlamydia urine: negative  12/22/17 syphilis screen: negative  10/31/20 [facility] ([place]) anaplasma IgG negative, IgM negative  10/31/20 [facility]) babesia IgG negative, IgM negative  10/31/20 [facility] ([place]) ehrlichia IgG negative, IgM " negative  10/31/20 [facility] ([place]) Lyme IgG positive, IgM negative  9/10/21 Lyme serologies: IgG positive, IgM negative  3/15/23 O[facility] ([facility]) Lyme serologies: IgG positive, IgM negative      Alfredito Solano MD

## 2023-04-05 NOTE — NURSING NOTE
"Chief Complaint   Patient presents with     RECHECK     lymes     /73   Pulse 66   Temp 97.4  F (36.3  C) (Oral)   Ht 1.854 m (6' 1\")   Wt 63.5 kg (139 lb 14.4 oz)   SpO2 98%   BMI 18.46 kg/m        Kaz Smiley MA  "

## 2023-04-30 ENCOUNTER — HEALTH MAINTENANCE LETTER (OUTPATIENT)
Age: 38
End: 2023-04-30

## 2023-06-17 DIAGNOSIS — F41.9 ANXIETY: ICD-10-CM

## 2023-06-19 RX ORDER — BUSPIRONE HYDROCHLORIDE 10 MG/1
10 TABLET ORAL EVERY 8 HOURS PRN
Qty: 270 TABLET | Refills: 0 | Status: SHIPPED | OUTPATIENT
Start: 2023-06-19 | End: 2023-10-23

## 2023-09-26 DIAGNOSIS — R51.9 SINUS HEADACHE: ICD-10-CM

## 2023-09-26 RX ORDER — FINASTERIDE 5 MG/1
TABLET, FILM COATED ORAL
Qty: 15 TABLET | Refills: 1 | Status: SHIPPED | OUTPATIENT
Start: 2023-09-26 | End: 2024-01-11

## 2023-10-23 DIAGNOSIS — F41.9 ANXIETY: ICD-10-CM

## 2023-10-23 RX ORDER — BUSPIRONE HYDROCHLORIDE 10 MG/1
10 TABLET ORAL EVERY 8 HOURS PRN
Qty: 90 TABLET | Refills: 0 | Status: SHIPPED | OUTPATIENT
Start: 2023-10-23 | End: 2023-11-25

## 2023-10-23 NOTE — TELEPHONE ENCOUNTER
LOV 11- Arianna  No future OV scheduled    Pharm note given  Mychart sent    Yamilex Casas RT (R)

## 2023-11-17 ENCOUNTER — OFFICE VISIT (OUTPATIENT)
Dept: PEDIATRICS | Facility: CLINIC | Age: 38
End: 2023-11-17
Payer: COMMERCIAL

## 2023-11-17 ENCOUNTER — NURSE TRIAGE (OUTPATIENT)
Dept: FAMILY MEDICINE | Facility: CLINIC | Age: 38
End: 2023-11-17

## 2023-11-17 VITALS
BODY MASS INDEX: 18.75 KG/M2 | DIASTOLIC BLOOD PRESSURE: 79 MMHG | WEIGHT: 142.1 LBS | TEMPERATURE: 98.1 F | RESPIRATION RATE: 16 BRPM | HEART RATE: 77 BPM | SYSTOLIC BLOOD PRESSURE: 110 MMHG | OXYGEN SATURATION: 99 %

## 2023-11-17 DIAGNOSIS — R53.83 OTHER FATIGUE: Primary | ICD-10-CM

## 2023-11-17 DIAGNOSIS — M54.50 LOW BACK PAIN, UNSPECIFIED BACK PAIN LATERALITY, UNSPECIFIED CHRONICITY, UNSPECIFIED WHETHER SCIATICA PRESENT: ICD-10-CM

## 2023-11-17 LAB
ALBUMIN SERPL BCG-MCNC: 4.7 G/DL (ref 3.5–5.2)
ALP SERPL-CCNC: 44 U/L (ref 40–150)
ALT SERPL W P-5'-P-CCNC: 22 U/L (ref 0–70)
ANION GAP SERPL CALCULATED.3IONS-SCNC: 11 MMOL/L (ref 7–15)
AST SERPL W P-5'-P-CCNC: 24 U/L (ref 0–45)
BASOPHILS # BLD AUTO: 0 10E3/UL (ref 0–0.2)
BASOPHILS NFR BLD AUTO: 0 %
BILIRUB SERPL-MCNC: 0.8 MG/DL
BUN SERPL-MCNC: 9.3 MG/DL (ref 6–20)
CALCIUM SERPL-MCNC: 9.6 MG/DL (ref 8.6–10)
CHLORIDE SERPL-SCNC: 105 MMOL/L (ref 98–107)
CHOLEST SERPL-MCNC: 192 MG/DL
CREAT SERPL-MCNC: 0.89 MG/DL (ref 0.67–1.17)
DEPRECATED HCO3 PLAS-SCNC: 24 MMOL/L (ref 22–29)
EGFRCR SERPLBLD CKD-EPI 2021: >90 ML/MIN/1.73M2
EOSINOPHIL # BLD AUTO: 0.1 10E3/UL (ref 0–0.7)
EOSINOPHIL NFR BLD AUTO: 2 %
ERYTHROCYTE [DISTWIDTH] IN BLOOD BY AUTOMATED COUNT: 12 % (ref 10–15)
ERYTHROCYTE [SEDIMENTATION RATE] IN BLOOD BY WESTERGREN METHOD: 3 MM/HR (ref 0–15)
GLUCOSE SERPL-MCNC: 95 MG/DL (ref 70–99)
HBA1C MFR BLD: 5.5 % (ref 0–5.6)
HCT VFR BLD AUTO: 42.5 % (ref 40–53)
HDLC SERPL-MCNC: 57 MG/DL
HGB BLD-MCNC: 14 G/DL (ref 13.3–17.7)
IMM GRANULOCYTES # BLD: 0 10E3/UL
IMM GRANULOCYTES NFR BLD: 0 %
LDLC SERPL CALC-MCNC: 124 MG/DL
LYMPHOCYTES # BLD AUTO: 1.9 10E3/UL (ref 0.8–5.3)
LYMPHOCYTES NFR BLD AUTO: 37 %
MCH RBC QN AUTO: 31.1 PG (ref 26.5–33)
MCHC RBC AUTO-ENTMCNC: 32.9 G/DL (ref 31.5–36.5)
MCV RBC AUTO: 94 FL (ref 78–100)
MONOCYTES # BLD AUTO: 0.5 10E3/UL (ref 0–1.3)
MONOCYTES NFR BLD AUTO: 10 %
NEUTROPHILS # BLD AUTO: 2.5 10E3/UL (ref 1.6–8.3)
NEUTROPHILS NFR BLD AUTO: 50 %
NONHDLC SERPL-MCNC: 135 MG/DL
PLATELET # BLD AUTO: 193 10E3/UL (ref 150–450)
POTASSIUM SERPL-SCNC: 4 MMOL/L (ref 3.4–5.3)
PROT SERPL-MCNC: 7.2 G/DL (ref 6.4–8.3)
RBC # BLD AUTO: 4.5 10E6/UL (ref 4.4–5.9)
SODIUM SERPL-SCNC: 140 MMOL/L (ref 135–145)
TRIGL SERPL-MCNC: 54 MG/DL
TSH SERPL DL<=0.005 MIU/L-ACNC: 1.9 UIU/ML (ref 0.3–4.2)
VIT B12 SERPL-MCNC: 642 PG/ML (ref 232–1245)
VIT D+METAB SERPL-MCNC: 63 NG/ML (ref 20–50)
WBC # BLD AUTO: 5.1 10E3/UL (ref 4–11)

## 2023-11-17 PROCEDURE — 36415 COLL VENOUS BLD VENIPUNCTURE: CPT | Performed by: INTERNAL MEDICINE

## 2023-11-17 PROCEDURE — 99214 OFFICE O/P EST MOD 30 MIN: CPT | Performed by: INTERNAL MEDICINE

## 2023-11-17 PROCEDURE — 82306 VITAMIN D 25 HYDROXY: CPT | Performed by: INTERNAL MEDICINE

## 2023-11-17 PROCEDURE — 85652 RBC SED RATE AUTOMATED: CPT | Performed by: INTERNAL MEDICINE

## 2023-11-17 PROCEDURE — 83036 HEMOGLOBIN GLYCOSYLATED A1C: CPT | Performed by: INTERNAL MEDICINE

## 2023-11-17 PROCEDURE — 85025 COMPLETE CBC W/AUTO DIFF WBC: CPT | Performed by: INTERNAL MEDICINE

## 2023-11-17 PROCEDURE — 84443 ASSAY THYROID STIM HORMONE: CPT | Performed by: INTERNAL MEDICINE

## 2023-11-17 PROCEDURE — 82607 VITAMIN B-12: CPT | Performed by: INTERNAL MEDICINE

## 2023-11-17 PROCEDURE — 80061 LIPID PANEL: CPT | Performed by: INTERNAL MEDICINE

## 2023-11-17 PROCEDURE — 80053 COMPREHEN METABOLIC PANEL: CPT | Performed by: INTERNAL MEDICINE

## 2023-11-17 ASSESSMENT — PAIN SCALES - GENERAL: PAINLEVEL: MILD PAIN (3)

## 2023-11-17 NOTE — PATIENT INSTRUCTIONS
"Lab work today:  We can do labs in the exam room today, or you can get them done downstairs in the lab.      If you are going downstairs:  Directions:  As you walk through the first floor, you'll see (on the right) first the pharmacy, then some bathrooms, then the \"Lab and Imaging\" area. Give them your name at the window there and wait for them to call you.     With respect to your back:  take aleve (naproxen) 2 tabs in AM and 2 tabs in the PM.    Use heating pad and massage.     If symptoms persist we can see you back to discuss next MyChart.    Consider holding the propranolol if fatigue worsens.     Ayaan Solano MD  Internal Medicine and Pediatrics     "

## 2023-11-17 NOTE — PROGRESS NOTES
"  Assessment & Plan     Other fatigue  Uncertain diagnosis.  May be beta blocker related, or anxiety related.  May be related to his working a lot and also doing home projects every evening.  No sit weight loss, but is chronically underwt so likely has no significant reserve.  Discussed using Ensure daily, labs today, and then follow up for possibly diagnostic imaging if symptoms persist; additional cancer screenings.    - Lipid panel reflex to direct LDL Non-fasting; Future  - CBC with platelets and differential; Future  - TSH with free T4 reflex; Future  - Vitamin B12; Future  - Vitamin D Deficiency; Future  - Comprehensive metabolic panel (BMP + Alb, Alk Phos, ALT, AST, Total. Bili, TP); Future  - ESR: Erythrocyte sedimentation rate; Future  - Hemoglobin A1c; Future    Low back pain, unspecified back pain laterality, unspecified chronicity, unspecified whether sciatica present  With right scrotal pain (mild).  No masses.  Doubt malignancy.    Patient Instructions   Lab work today:  We can do labs in the exam room today, or you can get them done downstairs in the lab.      If you are going downstairs:  Directions:  As you walk through the first floor, you'll see (on the right) first the pharmacy, then some bathrooms, then the \"Lab and Imaging\" area. Give them your name at the window there and wait for them to call you.     With respect to your back:  take aleve (naproxen) 2 tabs in AM and 2 tabs in the PM.    Use heating pad and massage.     If symptoms persist we can see you back to discuss next MyChart.    Consider holding the propranolol if fatigue worsens.     Ayana Solano MD  Internal Medicine and Pediatrics                 See Patient Instructions    Ayaan Solano MD  [facility] PILAR Deras is a 38 year old, presenting for the following health issues:  Pain (X 2-4 weeks, groin pain. Right groin and buttock pain ) and Blood Sugar Problem (X 2-3 weeks, noticed nausea, fatigues and " "is concerned it is related to blood sugars )      11/17/2023     1:24 PM   Additional Questions   Roomed by PONCHO Colmenares   Accompanied by RIC         11/17/2023     1:24 PM   Patient Reported Additional Medications   Patient reports taking the following new medications RIC       Pain    History of Present Illness       Reason for visit:  Groin pain and blood sugars  Symptom onset:  3-4 weeks ago  Symptoms include:  Pain comes and goes  Symptom intensity:  Moderate  Symptom progression:  Staying the same  Had these symptoms before:  No    He eats 0-1 servings of fruits and vegetables daily.He consumes 0 sweetened beverage(s) daily.He exercises with enough effort to increase his heart rate 9 or less minutes per day.  He exercises with enough effort to increase his heart rate 3 or less days per week.   He is taking medications regularly.     A few weeks ago, has noted achiness and not feeling well.  3-4-5 PM blood sugars get low.  Has to eat something.  Resting a lot.   Feels like is cloudy, tunnel vision.  Feels wiped.     Has had some low back pain after \"throwing out his back,.  Yesterday symptoms worsened.  Noted pain in right testicle after threw out his back; radiates to inner thigh and buttock.     Sleeps well.  Feels like anxiety is at its baseline.      No unexplained weight loss or gain.     No suicidal ideation or drugs of abuse.               Review of Systems   CONSTITUTIONAL: NEGATIVE for fever, chills, change in weight  INTEGUMENTARY/SKIN: NEGATIVE for worrisome rashes, moles or lesions  EYES: NEGATIVE for vision changes or irritation  ENT/MOUTH: NEGATIVE for ear, mouth and throat problems  RESP: NEGATIVE for significant cough or SOB  BREAST: NEGATIVE for masses, tenderness or discharge  CV: NEGATIVE for chest pain, palpitations or peripheral edema  GI: NEGATIVE for nausea, abdominal pain, heartburn, or change in bowel habits  : NEGATIVE for frequency, dysuria, or hematuria  MUSCULOSKELETAL: NEGATIVE for " significant arthralgias or myalgia  NEURO: NEGATIVE for weakness, dizziness or paresthesias  ENDOCRINE: NEGATIVE for temperature intolerance, skin/hair changes  HEME: NEGATIVE for bleeding problems  PSYCHIATRIC: NEGATIVE for changes in mood or affect      Objective    /79   Pulse 77   Temp 98.1  F (36.7  C) (Oral)   Resp 16   Wt 64.5 kg (142 lb 1.6 oz)   SpO2 99%   BMI 18.75 kg/m    Body mass index is 18.75 kg/m .  Physical Exam   GENERAL: thin, but NAD    EYES: Eyes grossly normal to inspection, PERRL and conjunctivae and sclerae normal  HENT: ear canals and TM's normal, nose and mouth without ulcers or lesions  NECK: no adenopathy, no asymmetry, masses, or scars and thyroid normal to palpation  RESP: lungs clear to auscultation - no rales, rhonchi or wheezes  CV: regular rate and rhythm, normal S1 S2, no S3 or S4, no murmur, click or rub, no peripheral edema and peripheral pulses strong  ABDOMEN: soft, nontender, no hepatosplenomegaly, no masses and bowel sounds normal  MS: no gross musculoskeletal defects noted, no edema  SKIN: no suspicious lesions or rashes  NEURO: Normal strength and tone, mentation intact and speech normal  PSYCH: mentation appears normal, affect normal/bright

## 2023-11-17 NOTE — TELEPHONE ENCOUNTER
Pt stating that he feels very week and needs to lay down. He started to have dizzy spells a few weeks ago. Since, last night it has gotten worse. At times he needed to sit down.  He is able to walk around this am, but feels weak. Has some nausea. Last night noticed right testicular pain (does not know if related).     Pt states he has been eating healthy and pushes fluids. Pt went to bed at 8 pm and woke up feeling the same way.    Scheduling made him an appointment for today (not at his primary). Pt is unaware of this appointment incase pt can be seen at his clinic.       Reason for Disposition   Lightheadedness (dizziness) present now, after 2 hours of rest and fluids    Additional Information   Negative: SEVERE difficulty breathing (e.g., struggling for each breath, speaks in single words)   Negative: Shock suspected (e.g., cold/pale/clammy skin, too weak to stand, low BP, rapid pulse)   Negative: Difficult to awaken or acting confused (e.g., disoriented, slurred speech)   Negative: Fainted, and still feels dizzy afterwards   Negative: Overdose (accidental or intentional) of medications   Negative: New neurologic deficit that is present now: * Weakness of the face, arm, or leg on one side of the body * Numbness of the face, arm, or leg on one side of the body * Loss of speech or garbled speech   Negative: Heart beating < 50 beats per minute OR > 140 beats per minute   Negative: Sounds like a life-threatening emergency to the triager   Negative: Chest pain   Negative: Rectal bleeding, bloody stool, or tarry-black stool   Negative: Vomiting is main symptom   Negative: Diarrhea is main symptom   Negative: Headache is main symptom   Negative: Heat exhaustion suspected (i.e., dehydration from heat exposure)   Negative: Patient states that they are having an anxiety or panic attack   Negative: Dizziness from low blood sugar (i.e., < 60 mg/dl or 3.5 mmol/l)   Negative: SEVERE dizziness (e.g., unable to stand, requires  "support to walk, feels like passing out now)   Negative: SEVERE headache or neck pain   Negative: Spinning or tilting sensation (vertigo) present now and one or more stroke risk factors (i.e., hypertension, diabetes mellitus, prior stroke/TIA, heart attack, age over 60) (Exception: Prior physician evaluation for this AND no different/worse than usual.)   Negative: Neurologic deficit that was brief (now gone), ANY of the following:* Weakness of the face, arm, or leg on one side of the body* Numbness of the face, arm, or leg on one side of the body* Loss of speech or garbled speech   Negative: Loss of vision or double vision  (Exception: Similar to previous migraines.)   Negative: Extra heartbeats, irregular heart beating, or heart is beating very fast (i.e., 'palpitations')   Negative: Difficulty breathing   Negative: Drinking very little and dehydration suspected (e.g., no urine > 12 hours, very dry mouth, very lightheaded)   Negative: Follows bleeding (e.g., stomach, rectum, vagina)  (Exception: Became dizzy from sight of small amount blood.)   Negative: Patient sounds very sick or weak to the triager    Answer Assessment - Initial Assessment Questions  1. DESCRIPTION: \"Describe your dizziness.\"      \"Like his whole body is just weak\"  2. LIGHTHEADED: \"Do you feel lightheaded?\" (e.g., somewhat faint, woozy, weak upon standing)      Woozy and at times he feels like he is going to faint  3. VERTIGO: \"Do you feel like either you or the room is spinning or tilting?\" (i.e. vertigo)      no  4. SEVERITY: \"How bad is it?\"  \"Do you feel like you are going to faint?\" \"Can you stand and walk?\"    - MILD: Feels slightly dizzy, but walking normally.    - MODERATE: Feels unsteady when walking, but not falling; interferes with normal activities (e.g., school, work).    - SEVERE: Unable to walk without falling, or requires assistance to walk without falling; feels like passing out now.       Moderate  5. ONSET:  \"When did the " "dizziness begin?\"      Started a few weeks ago, but last night it started to get worse  6. AGGRAVATING FACTORS: \"Does anything make it worse?\" (e.g., standing, change in head position)      no  7. HEART RATE: \"Can you tell me your heart rate?\" \"How many beats in 15 seconds?\"  (Note: not all patients can do this)          8. CAUSE: \"What do you think is causing the dizziness?\"      Unknown  9. RECURRENT SYMPTOM: \"Have you had dizziness before?\" If Yes, ask: \"When was the last time?\" \"What happened that time?\"      no  10. OTHER SYMPTOMS: \"Do you have any other symptoms?\" (e.g., fever, chest pain, vomiting, diarrhea, bleeding)        no  11. PREGNANCY: \"Is there any chance you are pregnant?\" \"When was your last menstrual period?\"        no    Protocols used: Dizziness-A-OH  Alesia Cordero RN  [facility]   "

## 2023-11-25 DIAGNOSIS — F41.9 ANXIETY: ICD-10-CM

## 2023-11-27 RX ORDER — BUSPIRONE HYDROCHLORIDE 10 MG/1
10 TABLET ORAL EVERY 8 HOURS PRN
Qty: 90 TABLET | Refills: 0 | Status: SHIPPED | OUTPATIENT
Start: 2023-11-27 | End: 2024-03-11

## 2024-01-11 DIAGNOSIS — F41.9 ANXIETY: ICD-10-CM

## 2024-01-11 DIAGNOSIS — R51.9 SINUS HEADACHE: ICD-10-CM

## 2024-01-11 RX ORDER — FINASTERIDE 5 MG/1
TABLET, FILM COATED ORAL
Qty: 15 TABLET | Refills: 1 | Status: SHIPPED | OUTPATIENT
Start: 2024-01-11 | End: 2024-03-29

## 2024-01-11 RX ORDER — PROPRANOLOL HYDROCHLORIDE 20 MG/1
20 TABLET ORAL 2 TIMES DAILY
Qty: 180 TABLET | Refills: 1 | Status: SHIPPED | OUTPATIENT
Start: 2024-01-11 | End: 2024-07-06

## 2024-03-11 DIAGNOSIS — F41.9 ANXIETY: ICD-10-CM

## 2024-03-12 RX ORDER — BUSPIRONE HYDROCHLORIDE 10 MG/1
10 TABLET ORAL EVERY 8 HOURS PRN
Qty: 30 TABLET | Refills: 0 | Status: SHIPPED | OUTPATIENT
Start: 2024-03-12 | End: 2024-03-29

## 2024-03-12 NOTE — TELEPHONE ENCOUNTER
No ans , left message to call clinic to sched appt with Ronnie here at [place].     Pt read Yamilex's message last Oct to sched appt with Ronnie but pt was seen at [place].  I sent MY CHART message to sched physical with a provider who will be refilling his medication.         Jade OLGUIN MA

## 2024-03-20 ENCOUNTER — TELEPHONE (OUTPATIENT)
Dept: NURSING | Facility: CLINIC | Age: 39
End: 2024-03-20
Payer: COMMERCIAL

## 2024-03-20 NOTE — TELEPHONE ENCOUNTER
Pt returning a call to the clinic regarding their request to schedule a physical for refill purposes. Pt states he was seen in November of 2023 and would like to speak to the clinic about this.     Writer let him know that he could send a Assemblyt message to the clinic, but he asked to be transferred to the clinic. He was transferred to the TC line at the clinic to discuss during their open clinic hours.     No triage needed for any symptoms by this FNA.     Keyana Hutchinson RN  [facility] Nurse Advisor 10:20 AM 3/20/2024

## 2024-03-25 NOTE — TELEPHONE ENCOUNTER
Last saw  Ronnie Keller PA-C     Nov 2022 (not 2023)     Pt did not read the MY CHART messages we sent him 3- or 10- to sched appt.      Due appt per refills:  March 11, 2024. Oct 23, 2023  (Notes to Pharmacy: Please advise patient: fasting annual physical due November 2023 with Ronnie Keller PAC )

## 2024-03-25 NOTE — TELEPHONE ENCOUNTER
I reached pt.  Scheduled this week for physical with Ronnie.    Pt concerned about the expense of having a physical but said go ahead and schedule.      Jade OLGUIN MA

## 2024-03-25 NOTE — TELEPHONE ENCOUNTER
I reached pt on phone.    Scheduled this week for physical with Ronnie.    Gave location, pt said knows right where we are.   Pt concerned about the expense of having a physical but said go ahead and schedule.      Jade OLGUIN MA

## 2024-03-27 ENCOUNTER — OFFICE VISIT (OUTPATIENT)
Dept: FAMILY MEDICINE | Facility: CLINIC | Age: 39
End: 2024-03-27
Payer: COMMERCIAL

## 2024-03-27 VITALS
OXYGEN SATURATION: 99 % | WEIGHT: 145 LBS | HEART RATE: 78 BPM | RESPIRATION RATE: 16 BRPM | SYSTOLIC BLOOD PRESSURE: 96 MMHG | HEIGHT: 73 IN | BODY MASS INDEX: 19.22 KG/M2 | DIASTOLIC BLOOD PRESSURE: 65 MMHG | TEMPERATURE: 98.3 F

## 2024-03-27 DIAGNOSIS — R51.9 CHRONIC NONINTRACTABLE HEADACHE, UNSPECIFIED HEADACHE TYPE: ICD-10-CM

## 2024-03-27 DIAGNOSIS — G89.29 CHRONIC NONINTRACTABLE HEADACHE, UNSPECIFIED HEADACHE TYPE: ICD-10-CM

## 2024-03-27 DIAGNOSIS — F41.9 ANXIETY: ICD-10-CM

## 2024-03-27 DIAGNOSIS — Z00.00 ANNUAL PHYSICAL EXAM: Primary | ICD-10-CM

## 2024-03-27 DIAGNOSIS — Z23 NEED FOR VACCINATION: ICD-10-CM

## 2024-03-27 PROCEDURE — 99214 OFFICE O/P EST MOD 30 MIN: CPT | Mod: 25 | Performed by: PHYSICIAN ASSISTANT

## 2024-03-27 PROCEDURE — 90715 TDAP VACCINE 7 YRS/> IM: CPT | Performed by: PHYSICIAN ASSISTANT

## 2024-03-27 PROCEDURE — 90471 IMMUNIZATION ADMIN: CPT | Performed by: PHYSICIAN ASSISTANT

## 2024-03-27 PROCEDURE — 99395 PREV VISIT EST AGE 18-39: CPT | Mod: 25 | Performed by: PHYSICIAN ASSISTANT

## 2024-03-27 RX ORDER — MULTIVITAMIN WITH IRON
1 TABLET ORAL DAILY
COMMUNITY

## 2024-03-27 RX ORDER — OMEGA-3-ACID ETHYL ESTERS 900 MG/1
CAPSULE, LIQUID FILLED ORAL
COMMUNITY

## 2024-03-27 RX ORDER — UBIDECARENONE 100 MG
100 CAPSULE ORAL DAILY
COMMUNITY

## 2024-03-27 RX ORDER — ESCITALOPRAM OXALATE 10 MG/1
10 TABLET ORAL DAILY
Qty: 30 TABLET | Refills: 2 | Status: SHIPPED | OUTPATIENT
Start: 2024-03-27 | End: 2024-04-19

## 2024-03-27 SDOH — HEALTH STABILITY: PHYSICAL HEALTH: ON AVERAGE, HOW MANY DAYS PER WEEK DO YOU ENGAGE IN MODERATE TO STRENUOUS EXERCISE (LIKE A BRISK WALK)?: 4 DAYS

## 2024-03-27 ASSESSMENT — PAIN SCALES - GENERAL: PAINLEVEL: NO PAIN (0)

## 2024-03-27 ASSESSMENT — SOCIAL DETERMINANTS OF HEALTH (SDOH): HOW OFTEN DO YOU GET TOGETHER WITH FRIENDS OR RELATIVES?: TWICE A WEEK

## 2024-03-27 NOTE — PROGRESS NOTES
Preventive Care Visit  [facility] ALESSIA Keller PA-C, Physician Assistant - Medical  Mar 27, 2024      Assessment & Plan     Annual physical exam  Completed labs in November which are reviewed.  No need to add additional labs today.    Annual labs ordered. Healthy diet and exercise reviewed. Recommended routine dental, eye, and skin screenings.     Anxiety  Initiate Lexapro 10 mg daily.  Discussed proper tapering of BuSpar.  Close monitoring for new or worsening anxiety or additional symptoms.  Reviewed this with him.  Option to follow-up in the next few months if no improvement or worsening.  - escitalopram (LEXAPRO) 10 MG tablet  Dispense: 30 tablet; Refill: 2    Chronic nonintractable headache, unspecified headache type  Continue propranolol as well as vitamins for prophylaxis.  Improved greatly.    Need for vaccination  Update Tdap.  - TDAP 10-64Y (ADACEL,BOOSTRIX)      Patient has been advised of split billing requirements and indicates understanding: Yes    30 minutes spent by me on the date of the encounter doing chart review, review of test results, interpretation of tests, patient visit, and documentation     Counseling  Appropriate preventive services were discussed with this patient, including applicable screening as appropriate for fall prevention, nutrition, physical activity, Tobacco-use cessation, weight loss and cognition.  Checklist reviewing preventive services available has been given to the patient.  Reviewed patient's diet, addressing concerns and/or questions.         Catherine Deras is a 38 year old, presenting for the following:  Physical       Here today for his annual physical. Works as a .   Doing well overall since our last meeting on 11/18/22 where he was suffering daily migraine headaches.    Since that visit has discontinued amitriptyline.  Been diligent about taking magnesium, b-complex, [id number] which he finds having great for prophylaxis.   Continues on propranolol.    Still dealing with anxiety which is main concern.  Takes BuSpar 10 mg twice daily.  Wonders if adding a low-dose SSRI and discontinuing the BuSpar would be an option for him.  Has been on Zoloft, Paxil etc in the past.       Health Care Directive  Patient does not have a Health Care Directive or Living Will: Discussed advance care planning with patient; however, patient declined at this time.            3/27/2024   General Health   How would you rate your overall physical health? (!) FAIR   Feel stress (tense, anxious, or unable to sleep) Rather much   (!) STRESS CONCERN      3/27/2024   Nutrition   Three or more servings of calcium each day? Yes   Diet: Regular (no restrictions)   How many servings of fruit and vegetables per day? 4 or more   How many sweetened beverages each day? 0-1         3/27/2024   Exercise   Days per week of moderate/strenous exercise 4 days         3/27/2024   Social Factors   Frequency of gathering with friends or relatives Twice a week   Worry food won't last until get money to buy more No   Food not last or not have enough money for food? No   Do you have housing?  Yes   Are you worried about losing your housing? No   Lack of transportation? No   Unable to get utilities (heat,electricity)? No         3/27/2024   Dental   Dentist two times every year? Yes         3/27/2024   TB Screening   Were you born outside of the [place]? No         Today's PHQ-2 Score:       3/27/2024     4:15 PM   PHQ-2 ( 1999 [employer])   Q1: Little interest or pleasure in doing things 0   Q2: Feeling down, depressed or hopeless 1   PHQ-2 Score 1   Q1: Little interest or pleasure in doing things Not at all   Q2: Feeling down, depressed or hopeless Several days   PHQ-2 Score 1           3/27/2024   Substance Use   Alcohol more than 3/day or more than 7/wk No   Do you use any other substances recreationally? (!) ALCOHOL    (!) PRESCRIPTION DRUGS     Social History     Tobacco Use    Smoking  status: Never     Passive exposure: Never    Smokeless tobacco: Never   Vaping Use    Vaping Use: Never used   Substance Use Topics    Alcohol use: Yes     Alcohol/week: 2.0 - 3.0 standard drinks of alcohol     Types: 2 - 3 Standard drinks or equivalent per week     Comment: occasional use    Drug use: No           3/27/2024   STI Screening   New sexual partner(s) since last STI/HIV test? No         3/27/2024   Contraception/Family Planning   Questions about contraception or family planning No        Reviewed and updated as needed this visit by Provider   Tobacco  Allergies  Meds   Med Hx  Surg Hx  Fam Hx            Past Medical History:   Diagnosis Date    Chickenpox     As a child around age 5    Genital herpes     Lyme disease      Past Surgical History:   Procedure Laterality Date    ZZC EXPLORE UNDESC TESTIS,ABDOMINAL       Lab work is in process  Labs reviewed in EPIC  BP Readings from Last 3 Encounters:   03/27/24 96/65   11/17/23 110/79   04/05/23 122/73    Wt Readings from Last 3 Encounters:   03/27/24 65.8 kg (145 lb)   11/17/23 64.5 kg (142 lb 1.6 oz)   04/05/23 63.5 kg (139 lb 14.4 oz)                  Patient Active Problem List   Diagnosis    Lyme disease    Genital herpes    Anxiety    Low back pain     Past Surgical History:   Procedure Laterality Date    ZZC EXPLORE UNDESC TESTIS,ABDOMINAL         Social History     Tobacco Use    Smoking status: Never     Passive exposure: Never    Smokeless tobacco: Never   Substance Use Topics    Alcohol use: Yes     Alcohol/week: 2.0 - 3.0 standard drinks of alcohol     Types: 2 - 3 Standard drinks or equivalent per week     Comment: occasional use     Family History   Problem Relation Age of Onset    Anxiety Disorder Mother     No Known Problems Sister     No Known Problems Brother     Cancer - colorectal Maternal Grandfather 50    Heart Disease Paternal Grandfather          Current Outpatient Medications   Medication Sig Dispense Refill    busPIRone  "(BUSPAR) 10 MG tablet Take 1 tablet (10 mg) by mouth every 8 hours as needed (anxiety) Max 3/day. Needs appointment for further refills 30 tablet 0    co-enzyme Q-10 100 MG CAPS capsule Take 100 mg by mouth daily      ergocalciferol (VITAMIN D2) 400 units (10 mcg) TABS tablet Take 400 Units by mouth daily      escitalopram (LEXAPRO) 10 MG tablet Take 1 tablet (10 mg) by mouth daily 30 tablet 2    finasteride (PROSCAR) 5 MG tablet TAKE 1/4 TABLET BY MOUTH DAILY. 15 tablet 1    magnesium 250 MG tablet Take 1 tablet by mouth daily      multivitamin w/minerals (THERA-VIT-M) tablet Take 1 tablet by mouth daily      Omega-3 Fatty Acids ([place] WILD FISH OIL) 650 MG CPDR Take 650 mg by mouth daily      Omega-3-acid Ethyl Esters (FISH OIL OMEGA-3 FATTY ACID) 320MG/ML oral suspension       propranolol (INDERAL) 20 MG tablet Take 1 tablet (20 mg) by mouth 2 times daily 180 tablet 1    TURMERIC CURCUMIN PO Take 1,350 mg by mouth daily      UNABLE TO FIND MEDICATION NAME: Lion's Jorge 500mg daily      vitamin B complex with vitamin C (VITAMIN  B COMPLEX) tablet Take 1 tablet by mouth daily      vitamin C with B complex (B COMPLEX-C) tablet Take 1 tablet by mouth daily       Allergies   Allergen Reactions    Other Food Allergy Swelling     Celery root         Review of Systems  Constitutional, neuro, ENT, endocrine, pulmonary, cardiac, gastrointestinal, genitourinary, musculoskeletal, integument and psychiatric systems are negative, except as otherwise noted.       Objective    Exam  BP 96/65 (BP Location: Right arm, Patient Position: Sitting, Cuff Size: Adult Regular)   Pulse 78   Temp 98.3  F (36.8  C) (Oral)   Resp 16   Ht 1.854 m (6' 1\")   Wt 65.8 kg (145 lb)   SpO2 99%   BMI 19.13 kg/m     Estimated body mass index is 19.13 kg/m  as calculated from the following:    Height as of this encounter: 1.854 m (6' 1\").    Weight as of this encounter: 65.8 kg (145 lb).    Physical Exam  GENERAL: alert and no distress  EYES: " Eyes grossly normal to inspection, PERRL and conjunctivae and sclerae normal  HENT: ear canals and TM's normal, nose and mouth without ulcers or lesions  NECK: no adenopathy, no asymmetry, masses, or scars  RESP: lungs clear to auscultation - no rales, rhonchi or wheezes  CV: regular rate and rhythm, normal S1 S2, no S3 or S4, no murmur, click or rub, no peripheral edema  ABDOMEN: soft, nontender, no hepatosplenomegaly, no masses and bowel sounds normal  MS: no gross musculoskeletal defects noted, no edema  SKIN: no suspicious lesions or rashes  NEURO: Normal strength and tone, mentation intact and speech normal  PSYCH: mentation appears normal, affect normal/bright    The likelihood of other entities in the differential is insufficient to justify any further testing for them at this time. This was explained to the patient. The patient was advised that persistent or worsening symptoms would require further evaluation. Patient advised to call the office and if unable to reach to go to the emergency room if they develop any new or worsening symptoms. Expressed understanding and agreement with above stated plan.     Signed Electronically by: Ronnie Keller PA-C

## 2024-03-29 DIAGNOSIS — R51.9 SINUS HEADACHE: ICD-10-CM

## 2024-03-29 DIAGNOSIS — F41.9 ANXIETY: ICD-10-CM

## 2024-03-29 RX ORDER — FINASTERIDE 5 MG/1
TABLET, FILM COATED ORAL
Qty: 15 TABLET | Refills: 1 | Status: SHIPPED | OUTPATIENT
Start: 2024-03-29 | End: 2024-08-05

## 2024-03-29 RX ORDER — BUSPIRONE HYDROCHLORIDE 10 MG/1
10 TABLET ORAL 2 TIMES DAILY
Qty: 60 TABLET | Refills: 2 | Status: SHIPPED | OUTPATIENT
Start: 2024-03-29 | End: 2024-07-03

## 2024-03-29 NOTE — TELEPHONE ENCOUNTER
LOV 3- Arianna for AWE  Per OV notes:  Anxiety  Initiate Lexapro 10 mg daily.  Discussed proper tapering of BuSpar.  Close monitoring for new or worsening anxiety or additional symptoms.  Reviewed this with him.  Option to follow-up in the next few months if no improvement or worsening.  - escitalopram (LEXAPRO) 10 MG tablet  Dispense: 30 tablet; Refill: 2      Pharmacy seeking refill of buspar.  Pended, however please send as appropriate.    Also needs refill of finasteride.    No future OV scheduled.    Yamilex Casas, RT (R)

## 2024-04-19 DIAGNOSIS — F41.9 ANXIETY: ICD-10-CM

## 2024-04-22 RX ORDER — ESCITALOPRAM OXALATE 10 MG/1
10 TABLET ORAL DAILY
Qty: 90 TABLET | Refills: 0 | Status: SHIPPED | OUTPATIENT
Start: 2024-04-22 | End: 2024-08-05

## 2024-07-03 DIAGNOSIS — F41.9 ANXIETY: ICD-10-CM

## 2024-07-03 RX ORDER — BUSPIRONE HYDROCHLORIDE 10 MG/1
10 TABLET ORAL 2 TIMES DAILY
Qty: 60 TABLET | Refills: 2 | Status: SHIPPED | OUTPATIENT
Start: 2024-07-03 | End: 2024-10-02

## 2024-07-06 DIAGNOSIS — F41.9 ANXIETY: ICD-10-CM

## 2024-07-08 RX ORDER — PROPRANOLOL HYDROCHLORIDE 20 MG/1
20 TABLET ORAL 2 TIMES DAILY
Qty: 180 TABLET | Refills: 1 | Status: SHIPPED | OUTPATIENT
Start: 2024-07-08

## 2024-08-05 DIAGNOSIS — F41.9 ANXIETY: ICD-10-CM

## 2024-08-05 DIAGNOSIS — R51.9 SINUS HEADACHE: ICD-10-CM

## 2024-08-05 RX ORDER — ESCITALOPRAM OXALATE 10 MG/1
10 TABLET ORAL DAILY
Qty: 90 TABLET | Refills: 1 | Status: SHIPPED | OUTPATIENT
Start: 2024-08-05

## 2024-08-05 RX ORDER — FINASTERIDE 5 MG/1
TABLET, FILM COATED ORAL
Qty: 23 TABLET | Refills: 1 | Status: SHIPPED | OUTPATIENT
Start: 2024-08-05

## 2024-10-02 DIAGNOSIS — F41.9 ANXIETY: ICD-10-CM

## 2024-10-02 RX ORDER — BUSPIRONE HYDROCHLORIDE 10 MG/1
10 TABLET ORAL 2 TIMES DAILY
Qty: 60 TABLET | Refills: 2 | Status: SHIPPED | OUTPATIENT
Start: 2024-10-02

## 2025-01-07 DIAGNOSIS — F41.9 ANXIETY: ICD-10-CM

## 2025-01-07 DIAGNOSIS — R51.9 SINUS HEADACHE: ICD-10-CM

## 2025-01-07 RX ORDER — FINASTERIDE 5 MG/1
TABLET, FILM COATED ORAL
Qty: 23 TABLET | Refills: 1 | Status: SHIPPED | OUTPATIENT
Start: 2025-01-07

## 2025-01-07 RX ORDER — BUSPIRONE HYDROCHLORIDE 10 MG/1
10 TABLET ORAL 2 TIMES DAILY
Qty: 60 TABLET | Refills: 2 | Status: SHIPPED | OUTPATIENT
Start: 2025-01-07

## 2025-03-05 DIAGNOSIS — F41.9 ANXIETY: ICD-10-CM

## 2025-03-05 RX ORDER — ESCITALOPRAM OXALATE 10 MG/1
10 TABLET ORAL DAILY
Qty: 90 TABLET | Refills: 0 | Status: SHIPPED | OUTPATIENT
Start: 2025-03-05

## 2025-03-20 ENCOUNTER — NURSE TRIAGE (OUTPATIENT)
Dept: FAMILY MEDICINE | Facility: CLINIC | Age: 40
End: 2025-03-20
Payer: COMMERCIAL

## 2025-03-20 NOTE — TELEPHONE ENCOUNTER
Patient calling asking for advise on on-going abdominal issues. Patient states he has been dealing with bloating 5 yrs. Lasted checked about 2-3 years.     Reason for Disposition   Abdominal pain is a chronic symptom (recurrent or ongoing AND lasting > 4 weeks)    Additional Information   Negative: Passed out (e.g., fainted, lost consciousness, blacked out and was not responding)   Negative: Shock suspected (e.g., cold/pale/clammy skin, too weak to stand, low BP, rapid pulse)   Negative: Sounds like a life-threatening emergency to the triager   Negative: SEVERE abdominal pain (e.g., excruciating)   Negative: Vomiting red blood or black (coffee ground) material   Negative: Blood in bowel movements  (Exception: Blood on surface of BM with constipation.)   Negative: Black or tarry bowel movements  (Exception: Chronic-unchanged black-grey BMs AND is taking iron pills or Pepto-Bismol.)   Negative: Unable to urinate (or only a few drops) and bladder feels very full   Negative: Pain in scrotum persists > 1 hour   Negative: MILD TO MODERATE constant pain lasting > 2 hours   Negative: MILD TO MODERATE constant pain lasting > 2 hours, and age > 60 years   Negative: Vomiting bile (green color)   Negative: Patient sounds very sick or weak to the triager   Negative: Vomiting and abdomen looks much more swollen than usual   Negative: White of the eyes have turned yellow (i.e., jaundice)   Negative: Blood in urine (red, pink, or tea-colored)   Negative: Fever > 103 F (39.4 C)   Negative: Fever > 101 F (38.3 C) and over 60 years of age   Negative: Fever > 100 F (37.8 C) and has diabetes mellitus or a weak immune system (e.g., HIV positive, cancer chemotherapy, organ transplant, splenectomy, chronic steroids)   Negative: Fever > 100 F (37.8 C) and bedridden (e.g., CVA, chronic illness, recovering from surgery)   Negative: MODERATE pain (e.g., interferes with normal activities) that comes and goes (cramps) lasts > 24 hours   "(Exception: Pain with Vomiting or Diarrhea - see that Protocol.)   Negative: Patient wants to be seen   Negative: MILD pain (e.g., does not interfere with normal activities) and pain comes and goes (cramps) lasts > 48 hours  (Exception: This same abdominal pain is a chronic symptom recurrent or ongoing AND present > 4 weeks.)    Answer Assessment - Initial Assessment Questions  1. LOCATION: \"Where does it hurt?\"       Hurts everywhere- it varies   2. RADIATION: \"Does the pain shoot anywhere else?\" (e.g., chest, back)      Occasionally into back/pelvis area   3. ONSET: \"When did the pain begin?\" (Minutes, hours or days ago)       Started about 5 years ago- during the treatment of lyme disease, lasted checked about 2-3 years ago  4. SUDDEN: \"Gradual or sudden onset?\"      5 years ago it was sudden during the lyme disease, has persisted since then and comes at goes    5. PATTERN \"Does the pain come and go, or is it constant?\"      Comes and goes   6. SEVERITY: \"How bad is the pain?\"  (e.g., Scale 1-10; mild, moderate, or severe)      1-2/10 right now can get 6/10 at times   7. RECURRENT SYMPTOM: \"Have you ever had this type of stomach pain before?\" If Yes, ask: \"When was the last time?\" and \"What happened that time?\"       Bloating, abdominal pain, headaches,   8. CAUSE: \"What do you think is causing the stomach pain?\"      Eating maybe  9. RELIEVING/AGGRAVATING FACTORS: \"What makes it better or worse?\" (e.g., antacids, bending or twisting motion, bowel movement)      Pt is not sure, it goes away on its own then comes back,   10. OTHER SYMPTOMS: \"Do you have any other symptoms?\" (e.g., back pain, diarrhea, fever, urination pain, vomiting)           Bloating, abdominal pain, headaches, at noon gets mental fatigue/fog, soft mushy stools    Protocols used: Abdominal Pain - Male-A-OH  Jen Nguyen RN   "

## 2025-03-27 ENCOUNTER — OFFICE VISIT (OUTPATIENT)
Dept: FAMILY MEDICINE | Facility: CLINIC | Age: 40
End: 2025-03-27
Payer: COMMERCIAL

## 2025-03-27 VITALS
OXYGEN SATURATION: 97 % | HEART RATE: 71 BPM | HEIGHT: 73 IN | SYSTOLIC BLOOD PRESSURE: 108 MMHG | DIASTOLIC BLOOD PRESSURE: 70 MMHG | WEIGHT: 144 LBS | BODY MASS INDEX: 19.09 KG/M2 | TEMPERATURE: 97.9 F

## 2025-03-27 DIAGNOSIS — F41.9 ANXIETY: ICD-10-CM

## 2025-03-27 DIAGNOSIS — R14.0 ABDOMINAL BLOATING: Primary | ICD-10-CM

## 2025-03-27 DIAGNOSIS — R19.4 BOWEL HABIT CHANGES: ICD-10-CM

## 2025-03-27 LAB
ALBUMIN SERPL BCG-MCNC: 4.4 G/DL (ref 3.5–5.2)
ALP SERPL-CCNC: 44 U/L (ref 40–150)
ALT SERPL W P-5'-P-CCNC: 22 U/L (ref 0–70)
ANION GAP SERPL CALCULATED.3IONS-SCNC: 9 MMOL/L (ref 7–15)
AST SERPL W P-5'-P-CCNC: 21 U/L (ref 0–45)
BASOPHILS # BLD AUTO: 0 10E3/UL (ref 0–0.2)
BASOPHILS NFR BLD AUTO: 0 %
BILIRUB SERPL-MCNC: 0.5 MG/DL
BUN SERPL-MCNC: 11.7 MG/DL (ref 6–20)
CALCIUM SERPL-MCNC: 9.4 MG/DL (ref 8.8–10.4)
CHLORIDE SERPL-SCNC: 106 MMOL/L (ref 98–107)
CREAT SERPL-MCNC: 0.92 MG/DL (ref 0.67–1.17)
CRP SERPL-MCNC: <3 MG/L
EGFRCR SERPLBLD CKD-EPI 2021: >90 ML/MIN/1.73M2
EOSINOPHIL # BLD AUTO: 0.1 10E3/UL (ref 0–0.7)
EOSINOPHIL NFR BLD AUTO: 2 %
ERYTHROCYTE [DISTWIDTH] IN BLOOD BY AUTOMATED COUNT: 11.9 % (ref 10–15)
GLUCOSE SERPL-MCNC: 103 MG/DL (ref 70–99)
HCO3 SERPL-SCNC: 26 MMOL/L (ref 22–29)
HCT VFR BLD AUTO: 42.5 % (ref 40–53)
HGB BLD-MCNC: 13.9 G/DL (ref 13.3–17.7)
IGA SERPL-MCNC: 159 MG/DL (ref 84–499)
IMM GRANULOCYTES # BLD: 0 10E3/UL
IMM GRANULOCYTES NFR BLD: 0 %
LIPASE SERPL-CCNC: 31 U/L (ref 13–60)
LYMPHOCYTES # BLD AUTO: 1.7 10E3/UL (ref 0.8–5.3)
LYMPHOCYTES NFR BLD AUTO: 38 %
MCH RBC QN AUTO: 31.2 PG (ref 26.5–33)
MCHC RBC AUTO-ENTMCNC: 32.7 G/DL (ref 31.5–36.5)
MCV RBC AUTO: 95 FL (ref 78–100)
MONOCYTES # BLD AUTO: 0.4 10E3/UL (ref 0–1.3)
MONOCYTES NFR BLD AUTO: 8 %
NEUTROPHILS # BLD AUTO: 2.3 10E3/UL (ref 1.6–8.3)
NEUTROPHILS NFR BLD AUTO: 52 %
PLATELET # BLD AUTO: 189 10E3/UL (ref 150–450)
POTASSIUM SERPL-SCNC: 4.2 MMOL/L (ref 3.4–5.3)
PROT SERPL-MCNC: 6.8 G/DL (ref 6.4–8.3)
RBC # BLD AUTO: 4.46 10E6/UL (ref 4.4–5.9)
SODIUM SERPL-SCNC: 141 MMOL/L (ref 135–145)
TSH SERPL DL<=0.005 MIU/L-ACNC: 1.59 UIU/ML (ref 0.3–4.2)
WBC # BLD AUTO: 4.5 10E3/UL (ref 4–11)

## 2025-03-27 RX ORDER — BUSPIRONE HYDROCHLORIDE 10 MG/1
10 TABLET ORAL 2 TIMES DAILY
Qty: 180 TABLET | Refills: 1 | Status: SHIPPED | OUTPATIENT
Start: 2025-03-27

## 2025-03-27 ASSESSMENT — PAIN SCALES - GENERAL: PAINLEVEL_OUTOF10: NO PAIN (0)

## 2025-03-27 NOTE — PROGRESS NOTES
Assessment & Plan     Abdominal bloating  Bowel habit changes    ~3-5 year history of generalized abdominal discomfort with no clear pattern associated bowel habit changes.  Start IBD/IBS workup.  Encouraged him to stay well-hydrated.  Start Metamucil.  Encouraged him to keep a log to help investigate patterns.  CBC, CMP, CRP, TSH, lipase IgA/calprotectin feces ordered today.  Next step would either be a referral to GI for consultation or pursuing a colonoscopy which I think is reasonable given length of time of symptoms as well as from a family history of colon cancer although my suspicion for colon cancer is rather low.  Abdomen nontender today.  Pain described as bloating/cramping.  Do not believe further abdominal imaging is needed at this time.  Reviewed ultrasound from 2022.  He is comfortable with this plan.  Reviewed signs symptoms in the meantime that warrant reevaluation.    - Tissue transglutaminase eleazar IgA and IgG  - TSH with free T4 reflex  - IgA  - Calprotectin Feces  - CRP inflammation  - Comprehensive metabolic panel  - Lipase  - CBC with Platelets & Differential    Anxiety  Switch to sertraline from Lexapro.  Refilled BuSpar.  - sertraline (ZOLOFT) 50 MG tablet  Dispense: 90 tablet; Refill: 1  - busPIRone (BUSPAR) 10 MG tablet  Dispense: 180 tablet; Refill: 1      30 minutes spent by me on the date of the encounter on chart review, review of test results, interpretation of tests, patient visit, and documentation       The longitudinal plan of care for the diagnosis(es)/condition(s) as documented were addressed during this visit. Due to the added complexity in care, I will continue to support Braeden in the subsequent management and with ongoing continuity of care.    No follow-ups on file.    The likelihood of other entities in the differential is insufficient to justify any further testing for them at this time. This was explained to the patient. The patient was advised that persistent or worsening  "symptoms would require further evaluation. Patient advised to call the office and if unable to reach to go to the emergency room if they develop any new or worsening symptoms. Expressed understanding and agreement with above stated plan.     [facility]YANIRA [facility]    Subjective   Braeden Escobar is a pleasant 39 year old male presenting for the following health issues:  Patient presents with:  Gastrointestinal Problem    Here today for evaluation longstanding generalized abdominal pain as well as bowel habit changes over the past few years. ~3-5 years.  Notes previous workup seems like in 2022.  Abdominal ultrasound negative.  Discomfort is typically right upper quadrant, central abdomen around the umbilical region as well as right lower quadrant.  Can last for few hours but also can last for a few days.  Typically has 1 bowel movement a day however over the past few years has noted this increase to 2 bowel movements per day often are soft.  Nonbloody.  No personal or family history that he is aware of of inflammatory bowel disease.  Grandfather did have colon cancer in his 50s.  He is unsure of any dietary triggers.  Of note a few years ago he did have Lyme disease and treated subsequently with a longer course of doxycycline.  He denies fever, chills, sweats.  No nausea or vomiting.  Denies GERD.  No major diet changes.  Weight stable.    Wishes to change back to sertraline from Lexapro.  Continues on BuSpar.    Wt Readings from Last 2 Encounters:   03/27/25 65.3 kg (144 lb)   03/27/24 65.8 kg (145 lb)       Review of Systems   Constitutional, HEENT, cardiovascular, pulmonary, GI, , musculoskeletal, neuro, skin, endocrine and psych systems are negative, except as otherwise noted.      Objective    /70 (BP Location: Right arm, Patient Position: Sitting, Cuff Size: Adult Large)   Pulse 71   Temp 97.9  F (36.6  C) (Oral)   Ht 1.854 m (6' 1\")   Wt 65.3 kg (144 lb)   SpO2 " "97%   BMI 19.00 kg/m    6' 1\"  144 lbs 0 oz    Physical Exam   GENERAL: healthy, alert and no distress  EYES: Eyes grossly normal to inspection, PERRL and conjunctivae and sclerae normal  NECK: no adenopathy, no asymmetry, masses, or scars and thyroid normal to palpation  RESP: lungs clear to auscultation - no rales, rhonchi or wheezes  CV: regular rate and rhythm, normal S1 S2, no S3 or S4, no murmur, click or rub, no peripheral edema and peripheral pulses strong  ABDOMEN: soft, nontender, no hepatosplenomegaly, no masses and bowel sounds normal  MS: no gross musculoskeletal defects noted, no edema  SKIN: no suspicious lesions or rashes  NEURO: Normal strength and tone, mentation intact and speech normal  PSYCH: mentation appears normal, affect normal/bright      Answers submitted by the patient for this visit:  General Questionnaire (Submitted on 3/27/2025)  Chief Complaint: Chronic problems general questions HPI Form  What is the reason for your visit today? : Gut  How many servings of fruits and vegetables do you eat daily?: 2-3  On average, how many sweetened beverages do you drink each day (Examples: soda, juice, sweet tea, etc.  Do NOT count diet or artificially sweetened beverages)?: 1  How many minutes a day do you exercise enough to make your heart beat faster?: 20 to 29  How many days a week do you exercise enough to make your heart beat faster?: 4  How many days per week do you miss taking your medication?: 0  Questionnaire about: Chronic problems general questions HPI Form (Submitted on 3/27/2025)  Chief Complaint: Chronic problems general questions HPI Form    "

## 2025-03-31 LAB
TTG IGA SER-ACNC: 0.7 U/ML
TTG IGG SER-ACNC: 0.8 U/ML

## 2025-03-31 NOTE — TELEPHONE ENCOUNTER
Diabetes: controlled  DIABETES A1c GOAL: <7%    Diabetes DATA & PLAN  - the last A1c was 5.7 (03/24/2025), this is the same based on prior result of  5.7 (08/26/2024)  - Prescribed statin? Yes  - Diabetes labs: LDL: 47 (3/24/2025). eGFR: >90 (3/24/2025) urineACR 6.8 (3/24/2025).  Plan: continue farxiga 10 mg daily & lifestyle changes.   Orders:    CBC with Automated Differential; Future    Comprehensive Metabolic Panel; Future    Glycohemoglobin; Future     Please clarify reasoning for referral is patient having symptoms, new exposure or new positive test?     As patient was treated when originally diagnosed.

## 2025-04-03 NOTE — TELEPHONE ENCOUNTER
REFERRAL INFORMATION:  Referring Provider:      Ronnie Keller PA-C     Referring Clinic:  [facility]   Reason for Visit/Diagnosis:   R14.0 (ICD-10-CM) - Abdominal bloating   R19.4 (ICD-10-CM) - Bowel habit changes        FUTURE VISIT INFORMATION:  Appointment Date: 4/28/25     NOTES STATUS DETAILS   OFFICE NOTE from Referring Provider Internal 3/27/25   MEDICATION LIST Internal    PROCEDURES     STOOL TESTING     LABS     PERTINENT LABS Internal    IMAGES     ULTRASOUND In process [facility]:  2/4/22- US abdomen     Records Requested   April 3, 2025 1:55 PM  [email or web address]   Facility  [facility]   Outcome Requested image

## 2025-04-28 ENCOUNTER — PRE VISIT (OUTPATIENT)
Dept: GASTROENTEROLOGY | Facility: CLINIC | Age: 40
End: 2025-04-28

## 2025-05-11 ENCOUNTER — HEALTH MAINTENANCE LETTER (OUTPATIENT)
Age: 40
End: 2025-05-11

## 2025-07-02 ENCOUNTER — NURSE TRIAGE (OUTPATIENT)
Dept: FAMILY MEDICINE | Facility: CLINIC | Age: 40
End: 2025-07-02
Payer: COMMERCIAL

## 2025-07-02 NOTE — TELEPHONE ENCOUNTER
Pt called reporting an episode of rectal bleeding ~85% blood with passing stool about 20 mins ago. He left work after that and is wondering what to do     APPEARANCE: red blood, mainly all blood   AMOUNT: turned bowl bright red   FREQUENCY: one episode  DIARRHEA? Yes maybe nothing hard. Mainly has loose stools in general   RECURRENT? No   ANTICOAGULATION?  No   OTHER: denies abdominal pain, felt lightheaded and nauseated right after episode, unsure if it was related to being shocked by it. Slight queasiness now, feels like he could burp.     In past week has been taking big pills - creatinine supplement, otherwise denies any surgeries or recent changes     Wiping was nothing but blood, has not continued to see blood since episode.     Per protocol: see in OV today. Nothing available. Requires 2nd level triage     Huddled with PCP: advised pt use fiber supplement, stay hydrated, and keep an eye on stools. If recurring and/or any new symptoms or worsening then would need to be seen. Also reviewed symptoms that would require an ED visit with patient     Pt verbalized understanding and agreement     Tresa LAZCANO, Triage RN  [facility]       Reason for Disposition   MODERATE rectal bleeding (small blood clots, passing blood without stool, or toilet water turns red)    Additional Information   Negative: Passed out (e.g., fainted, lost consciousness, blacked out and was not responding)   Negative: Shock suspected (e.g., cold/pale/clammy skin, too weak to stand, low BP, rapid pulse)   Negative: Vomiting red blood or black (coffee ground) material   Negative: Sounds like a life-threatening emergency to the triager   Negative: Diarrhea is main symptom   Negative: Rectal symptoms   Negative: SEVERE rectal bleeding (large blood clots; constant or on and off bleeding)   Negative: SEVERE dizziness (e.g., unable to stand, requires support to walk, feels like passing out now)   Negative: MODERATE  rectal bleeding (small blood clots, passing blood without stool, or toilet water turns red) more than once a day   Negative: Bloody, black, or tarry bowel movements  (Exception: Chronic-unchanged black-grey bowel movements and is taking iron pills or Pepto-Bismol.)   Negative: High-risk adult (e.g., prior surgery on aorta, abdominal aortic aneurysm)   Negative: Rectal foreign body (inserted or swallowed)   Negative: SEVERE abdominal pain (e.g., excruciating)   Negative: Constant abdominal pain lasting > 2 hours   Negative: Pale skin (pallor) of new-onset or getting worse   Negative: Patient sounds very sick or weak to the triager    Protocols used: Rectal Bleeding-A-OH